# Patient Record
Sex: FEMALE | Race: WHITE | NOT HISPANIC OR LATINO | Employment: OTHER | URBAN - METROPOLITAN AREA
[De-identification: names, ages, dates, MRNs, and addresses within clinical notes are randomized per-mention and may not be internally consistent; named-entity substitution may affect disease eponyms.]

---

## 2017-04-16 ENCOUNTER — TELEPHONE (OUTPATIENT)
Dept: NURSING | Facility: CLINIC | Age: 37
End: 2017-04-16

## 2017-04-16 NOTE — TELEPHONE ENCOUNTER
"Call Type: Triage Call    Presenting Problem: Patient is having a \"manic episode,\" and can't  sleeep. Patient stopped taking zoloft per Deon's nurse line.  Triaged for bipolar disorder, Diagnosed/Disposition to see ED  immediately.  Triage Note:  Guideline Title: Bipolar Disorder, Diagnosed  Recommended Disposition: See ED Immediately  Original Inclination: Wanted to speak with a nurse  Override Disposition:  Intended Action: Go to Hospital / ED  Physician Contacted: No  History of recurrent manic episodes AND having similar symptoms now ?  YES  Incoherent speech ? NO  Alcohol abuse - known or suspected ? NO  Currently engaging in violent behavior ? NO  Currently threatening violence ? NO  Substance abuse known or suspected ? NO  Depression known or suspected ? NO  Hearing voices that no one else hears or seeing things that no one else sees ? NO  Any suicidal or homicidal threat(s) with defined plan ? NO  Any suicidal or homicidal threat AND hallucinations, delirium, delusions or  paranoia ? NO  New or worsening confusion, disorientation, or agitation ? NO  Any homicidal/destructive ideation, any suicidal ideation, any history of suicide  attempts, and/or any history of self destructive behavior ? NO  Any suicidal or homicidal attempt(s) or gesture(s) in progress. ? NO  Irritable/agitated AND recently used alcohol or illegal drugs (PCP, cocaine,  methamphetamine) or overdose of prescription medication ? NO  Physician Instructions:  Care Advice: Another adult should drive.  Remove dangerous objects from patient's access.  Avoid confronting or agitating patient.  Minimize stimulation in environment.  Call  if behavior becomes actively suicidal, self-destructive, or  violent.  Protect the patient from harm or injury, if possible, while not putting  others or yourself at risk.  Should not be alone, arrange for support (family member, friend, etc.).  IMMEDIATE ACTION  Try to discourage or prevent reckless " behavior (keep car keys from patient)  without worsening threatening behavior.  Write down provider's name. List or place the following in a bag for  transport with the patient: current prescription and/or nonprescription  medications  alternative treatments, therapies and medications  and street drugs.  Avoid or limit use of caffeine, alcohol and non-prescription drugs.

## 2018-10-05 ENCOUNTER — OFFICE VISIT (OUTPATIENT)
Dept: URGENT CARE | Facility: URGENT CARE | Age: 38
End: 2018-10-05
Payer: COMMERCIAL

## 2018-10-05 ENCOUNTER — RADIANT APPOINTMENT (OUTPATIENT)
Dept: GENERAL RADIOLOGY | Facility: CLINIC | Age: 38
End: 2018-10-05
Attending: PHYSICIAN ASSISTANT
Payer: COMMERCIAL

## 2018-10-05 VITALS
HEART RATE: 101 BPM | TEMPERATURE: 98.6 F | DIASTOLIC BLOOD PRESSURE: 74 MMHG | WEIGHT: 200 LBS | SYSTOLIC BLOOD PRESSURE: 112 MMHG | OXYGEN SATURATION: 98 %

## 2018-10-05 DIAGNOSIS — R05.9 COUGH: ICD-10-CM

## 2018-10-05 DIAGNOSIS — R53.83 FATIGUE, UNSPECIFIED TYPE: ICD-10-CM

## 2018-10-05 DIAGNOSIS — R10.84 ABDOMINAL PAIN, GENERALIZED: ICD-10-CM

## 2018-10-05 DIAGNOSIS — R05.9 COUGH: Primary | ICD-10-CM

## 2018-10-05 LAB
ALBUMIN SERPL-MCNC: 3.9 G/DL (ref 3.4–5)
ALP SERPL-CCNC: 85 U/L (ref 40–150)
ALT SERPL W P-5'-P-CCNC: 42 U/L (ref 0–50)
ANION GAP SERPL CALCULATED.3IONS-SCNC: 6 MMOL/L (ref 3–14)
AST SERPL W P-5'-P-CCNC: 37 U/L (ref 0–45)
BASOPHILS # BLD AUTO: 0.1 10E9/L (ref 0–0.2)
BASOPHILS NFR BLD AUTO: 0.5 %
BILIRUB SERPL-MCNC: 0.6 MG/DL (ref 0.2–1.3)
BUN SERPL-MCNC: 9 MG/DL (ref 7–30)
CALCIUM SERPL-MCNC: 9.7 MG/DL (ref 8.5–10.1)
CHLORIDE SERPL-SCNC: 106 MMOL/L (ref 94–109)
CO2 SERPL-SCNC: 27 MMOL/L (ref 20–32)
CREAT SERPL-MCNC: 0.8 MG/DL (ref 0.52–1.04)
DIFFERENTIAL METHOD BLD: ABNORMAL
EOSINOPHIL # BLD AUTO: 0.6 10E9/L (ref 0–0.7)
EOSINOPHIL NFR BLD AUTO: 4.3 %
ERYTHROCYTE [DISTWIDTH] IN BLOOD BY AUTOMATED COUNT: 13.4 % (ref 10–15)
GFR SERPL CREATININE-BSD FRML MDRD: 80 ML/MIN/1.7M2
GLUCOSE SERPL-MCNC: 153 MG/DL (ref 70–99)
HCT VFR BLD AUTO: 45.8 % (ref 35–47)
HGB BLD-MCNC: 14.7 G/DL (ref 11.7–15.7)
LYMPHOCYTES # BLD AUTO: 2.9 10E9/L (ref 0.8–5.3)
LYMPHOCYTES NFR BLD AUTO: 22.7 %
MCH RBC QN AUTO: 28.5 PG (ref 26.5–33)
MCHC RBC AUTO-ENTMCNC: 32.1 G/DL (ref 31.5–36.5)
MCV RBC AUTO: 89 FL (ref 78–100)
MONOCYTES # BLD AUTO: 0.7 10E9/L (ref 0–1.3)
MONOCYTES NFR BLD AUTO: 5.3 %
NEUTROPHILS # BLD AUTO: 8.7 10E9/L (ref 1.6–8.3)
NEUTROPHILS NFR BLD AUTO: 67.2 %
PLATELET # BLD AUTO: 429 10E9/L (ref 150–450)
POTASSIUM SERPL-SCNC: 4 MMOL/L (ref 3.4–5.3)
PROT SERPL-MCNC: 7.7 G/DL (ref 6.8–8.8)
RBC # BLD AUTO: 5.16 10E12/L (ref 3.8–5.2)
SODIUM SERPL-SCNC: 139 MMOL/L (ref 133–144)
WBC # BLD AUTO: 13 10E9/L (ref 4–11)

## 2018-10-05 PROCEDURE — 84443 ASSAY THYROID STIM HORMONE: CPT | Performed by: PHYSICIAN ASSISTANT

## 2018-10-05 PROCEDURE — 36415 COLL VENOUS BLD VENIPUNCTURE: CPT | Performed by: PHYSICIAN ASSISTANT

## 2018-10-05 PROCEDURE — 85025 COMPLETE CBC W/AUTO DIFF WBC: CPT | Performed by: PHYSICIAN ASSISTANT

## 2018-10-05 PROCEDURE — 80053 COMPREHEN METABOLIC PANEL: CPT | Performed by: PHYSICIAN ASSISTANT

## 2018-10-05 PROCEDURE — 71046 X-RAY EXAM CHEST 2 VIEWS: CPT

## 2018-10-05 PROCEDURE — 99203 OFFICE O/P NEW LOW 30 MIN: CPT | Performed by: PHYSICIAN ASSISTANT

## 2018-10-05 RX ORDER — LITHIUM CARBONATE 300 MG/1
1200 TABLET, FILM COATED, EXTENDED RELEASE ORAL
COMMUNITY
End: 2019-04-17

## 2018-10-05 RX ORDER — SERTRALINE HYDROCHLORIDE 100 MG/1
100 TABLET, FILM COATED ORAL
COMMUNITY
Start: 2017-03-26 | End: 2019-04-17

## 2018-10-05 RX ORDER — AZITHROMYCIN 250 MG/1
TABLET, FILM COATED ORAL
Qty: 6 TABLET | Refills: 0 | Status: SHIPPED | OUTPATIENT
Start: 2018-10-05 | End: 2019-04-05

## 2018-10-05 NOTE — MR AVS SNAPSHOT
"              After Visit Summary   10/5/2018    Katrin KEITH    MRN: [id number]           Patient Information     Date Of Birth          1980        Visit Information        Provider Department      10/5/2018 9:30 AM Gudelia Spencer PA-C [facility]        Today's Diagnoses     Cough    -  1    Fatigue, unspecified type        Abdominal pain, generalized           Follow-ups after your visit        Who to contact     If you have questions or need follow up information about today's clinic visit or your schedule please contact [facility] directly at [phone].  Normal or non-critical lab and imaging results will be communicated to you by [employer], letter or phone within 4 business days after the clinic has received the results. If you do not hear from us within 7 days, please contact the clinic through [employer] or phone. If you have a critical or abnormal lab result, we will notify you by phone as soon as possible.  Submit refill requests through [employer] or call your pharmacy and they will forward the refill request to us. Please allow 3 business days for your refill to be completed.          Additional Information About Your Visit        MyChart Information     [employer] lets you send messages to your doctor, view your test results, renew your prescriptions, schedule appointments and more. To sign up, go to [email or web address] . Click on \"Log in\" on the left side of the screen, which will take you to the Welcome page. Then click on \"Sign up Now\" on the right side of the page.     You will be asked to enter the access code listed below, as well as some personal information. Please follow the directions to create your username and password.     Your access code is: [email or web address]  Expires: 2019 12:43 PM     Your access code will  in 90 days. If you need help or a new code, please call your [facility] clinic or [phone].        Care EveryWhere ID     This is " your Care EveryWhere ID. This could be used by other organizations to access your [facility] medical records  [id number]        Your Vitals Were     Pulse Temperature Pulse Oximetry             101 98.6  F (37  C) (Tympanic) 98%          Blood Pressure from Last 3 Encounters:   10/05/18 112/74   09/30/05 110/66    Weight from Last 3 Encounters:   10/05/18 200 lb (90.7 kg)   09/30/05 159 lb 12.8 oz (72.5 kg)              We Performed the Following     CBC with platelets and differential     Comprehensive metabolic panel (BMP + Alb, Alk Phos, ALT, AST, Total. Bili, TP)     TSH with free T4 reflex          Today's Medication Changes          These changes are accurate as of 10/5/18 11:59 PM.  If you have any questions, ask your nurse or doctor.               Start taking these medicines.        Dose/Directions    azithromycin 250 MG tablet   Commonly known as:  ZITHROMAX   Used for:  Cough   Started by:  Gudelia Spencer PA-C        Two tablets first day, then one tablet daily for four days.   Quantity:  6 tablet   Refills:  0            Where to get your medicines      These medications were sent to [id number] - [place], [place] - [address] [facility]  [address] [facility] [address]     Phone:  [phone]     azithromycin 250 MG tablet                Primary Care Provider Fax #    Physician No Ref-Primary [phone]       No address on file        Equal Access to Services     ELOINA ARNETT AH: Jose onofre Soyoana, waaxda luqadaha, qaybta kaalmada eladio, andres srivastava. So [facility] [phone].    ATENCIÓN: Si habla español, tiene a vides disposición servicios gratuitos de asistencia lingüística. Llame al [phone].    We comply with applicable federal civil rights laws and [place] laws. We do not discriminate on the basis of race, color, national origin, age, disability, sex, sexual orientation, or gender identity.            Thank you!      Thank you for choosing [facility]  for your care. Our goal is always to provide you with excellent care. Hearing back from our patients is one way we can continue to improve our services. Please take a few minutes to complete the written survey that you may receive in the mail after your visit with us. Thank you!             Your Updated Medication List - Protect others around you: Learn how to safely use, store and throw away your medicines at www.disposemymeds.org.          This list is accurate as of 10/5/18 11:59 PM.  Always use your most recent med list.                   Brand Name Dispense Instructions for use Diagnosis    azithromycin 250 MG tablet    ZITHROMAX    6 tablet    Two tablets first day, then one tablet daily for four days.    Cough       LAMICTAL PO      Take 300 mg by mouth        lithium 300 MG CR tablet    ESKALITH/LITHOBID     Take 1,200 mg by mouth        sertraline 100 MG tablet    ZOLOFT     Take 100 mg by mouth

## 2018-10-05 NOTE — PROGRESS NOTES
SUBJECTIVE:   Katrin KEITH is a 38 year old female presenting with a chief complaint of cough, cold sx, stomach crams.  Did have a few episodes of coughing blood but looked dark brown and no bright red.  Denies SOB or chest pain.  No UTI or vaginal sx.  No fevers.  Still able to eat and drink well.  No travel . No weight loss, bruising.    Onset of symptoms was 1 week(s) ago.  Course of illness is same.    Severity moderate  Current and Associated symptoms: lucas  Treatment measures tried include Tylenol/Ibuprofen, Fluids and Rest.  Predisposing factors include none.    Past Medical History:   Diagnosis Date     Allergic rhinitis, cause unspecified      Current Outpatient Prescriptions   Medication Sig Dispense Refill     azithromycin (ZITHROMAX) 250 MG tablet Two tablets first day, then one tablet daily for four days. 6 tablet 0     LamoTRIgine (LAMICTAL PO) Take 300 mg by mouth       lithium (ESKALITH/LITHOBID) 300 MG CR tablet Take 1,200 mg by mouth       sertraline (ZOLOFT) 100 MG tablet Take 100 mg by mouth       Social History   Substance Use Topics     Smoking status: Never Smoker     Smokeless tobacco: Not on file     Alcohol use Yes      Comment: 2-3 drinks per week       ROS:  Review of systems negative except as stated above.    OBJECTIVE:  /74 (BP Location: Right arm, Patient Position: Chair, Cuff Size: Adult Large)  Pulse 101  Temp 98.6  F (37  C) (Tympanic)  Wt 200 lb (90.7 kg)  SpO2 98%  GENERAL APPEARANCE: healthy, alert and no distress  EYES: EOMI,  PERRL, conjunctiva clear  HENT: ear canals and TM's normal.  Nose and mouth without ulcers, erythema or lesions  NECK: supple, nontender, no lymphadenopathy  RESP: lungs clear to auscultation - no rales, rhonchi or wheezes  CV: regular rates and rhythm, normal S1 S2, no murmur noted  ABDOMEN: obese, soft, normal bowel sounds, no significant tenderness.  No guarding or rebound tenderness.    NEURO: Normal strength and tone, sensory exam  grossly normal,  normal speech and mentation  SKIN: no suspicious lesions or rashes    Results for orders placed or performed in visit on 10/05/18   CBC with platelets and differential   Result Value Ref Range    WBC 13.0 (H) 4.0 - 11.0 10e9/L    RBC Count 5.16 3.8 - 5.2 10e12/L    Hemoglobin 14.7 11.7 - 15.7 g/dL    Hematocrit 45.8 35.0 - 47.0 %    MCV 89 78 - 100 fl    MCH 28.5 26.5 - 33.0 pg    MCHC 32.1 31.5 - 36.5 g/dL    RDW 13.4 10.0 - 15.0 %    Platelet Count 429 150 - 450 10e9/L    % Neutrophils 67.2 %    % Lymphocytes 22.7 %    % Monocytes 5.3 %    % Eosinophils 4.3 %    % Basophils 0.5 %    Absolute Neutrophil 8.7 (H) 1.6 - 8.3 10e9/L    Absolute Lymphocytes 2.9 0.8 - 5.3 10e9/L    Absolute Monocytes 0.7 0.0 - 1.3 10e9/L    Absolute Eosinophils 0.6 0.0 - 0.7 10e9/L    Absolute Basophils 0.1 0.0 - 0.2 10e9/L    Diff Method Automated Method    TSH with free T4 reflex   Result Value Ref Range    TSH 2.08 0.40 - 4.00 mU/L   Comprehensive metabolic panel (BMP + Alb, Alk Phos, ALT, AST, Total. Bili, TP)   Result Value Ref Range    Sodium 139 133 - 144 mmol/L    Potassium 4.0 3.4 - 5.3 mmol/L    Chloride 106 94 - 109 mmol/L    Carbon Dioxide 27 20 - 32 mmol/L    Anion Gap 6 3 - 14 mmol/L    Glucose 153 (H) 70 - 99 mg/dL    Urea Nitrogen 9 7 - 30 mg/dL    Creatinine 0.80 0.52 - 1.04 mg/dL    GFR Estimate 80 >60 mL/min/1.7m2    GFR Estimate If Black >90 >60 mL/min/1.7m2    Calcium 9.7 8.5 - 10.1 mg/dL    Bilirubin Total 0.6 0.2 - 1.3 mg/dL    Albumin 3.9 3.4 - 5.0 g/dL    Protein Total 7.7 6.8 - 8.8 g/dL    Alkaline Phosphatase 85 40 - 150 U/L    ALT 42 0 - 50 U/L    AST 37 0 - 45 U/L     Chest x-ray - Heart size is normal. No pleural effusion, pneumothorax,  or abnormal area of consolidation. Nodule is noted in the  inferolateral left lung, measuring up to 1.3 cm. This may be calcified  and therefore indicative of a granuloma. However, comparison to  previous radiographs. If none, followup chest x-ray in 3-6  months.  Additional dense nodule overlies the right hemithorax on PA  radiograph, likely a 5 mm granuloma.    assessment/plan:  (R05) Cough  (primary encounter diagnosis)  Comment:   Plan: XR Chest 2 Views, CBC with platelets and         differential, azithromycin (ZITHROMAX) 250 MG         tablet          X-ray as above and attempted to call with radiology report. Case discussed with Dr Phoenix due to sx and elevated slightly WBC and agrees with above plan.  OTC med for sx relief and to Follow-up with PCP as needed if sx worsen or new sx develop    (R53.83) Fatigue, unspecified type  Comment:   Plan: TSH with free T4 reflex         Follow-up with PCP     (R10.84) Abdominal pain, generalized  Comment:   Plan: Comprehensive metabolic panel (BMP + Alb, Alk         Phos, ALT, AST, Total. Bili, TP)         Declines abdominal x-ray.  If worsens to the ER for some other imagining.  Red flag signs discussed and to Follow-up with PCP as needed. .

## 2018-10-06 LAB — TSH SERPL DL<=0.005 MIU/L-ACNC: 2.08 MU/L (ref 0.4–4)

## 2018-10-08 ENCOUNTER — TELEPHONE (OUTPATIENT)
Dept: URGENT CARE | Facility: URGENT CARE | Age: 38
End: 2018-10-08

## 2018-10-09 NOTE — TELEPHONE ENCOUNTER
Attempted to contact patient regarding chest x-ray.  No answer.  Please try to call and advise that Follow-up in 3-6 months for repeat x-ray per radiology recommendation... See report     Zoraida Spencer

## 2018-10-10 NOTE — TELEPHONE ENCOUNTER
Called patient, left message to return phone call.     Please advise Radiology report and Gudelia Spencer message when returning phone call.     Jd Weiss, Medical Assistant

## 2019-02-26 DIAGNOSIS — F31.81 BIPOLAR II DISORDER (H): Primary | ICD-10-CM

## 2019-02-26 DIAGNOSIS — Z79.899 ENCOUNTER FOR LONG-TERM (CURRENT) USE OF HIGH-RISK MEDICATION: ICD-10-CM

## 2019-02-26 LAB — LITHIUM SERPL-SCNC: 0.49 MMOL/L (ref 0.6–1.2)

## 2019-02-26 PROCEDURE — 36415 COLL VENOUS BLD VENIPUNCTURE: CPT | Performed by: CLINICAL NURSE SPECIALIST

## 2019-02-26 PROCEDURE — 80178 ASSAY OF LITHIUM: CPT | Performed by: CLINICAL NURSE SPECIALIST

## 2019-02-26 PROCEDURE — 82565 ASSAY OF CREATININE: CPT | Performed by: CLINICAL NURSE SPECIALIST

## 2019-02-26 PROCEDURE — 84443 ASSAY THYROID STIM HORMONE: CPT | Performed by: CLINICAL NURSE SPECIALIST

## 2019-02-26 PROCEDURE — 84520 ASSAY OF UREA NITROGEN: CPT | Performed by: CLINICAL NURSE SPECIALIST

## 2019-02-27 LAB
BUN SERPL-MCNC: 11 MG/DL (ref 7–30)
CREAT SERPL-MCNC: 0.75 MG/DL (ref 0.52–1.04)
GFR SERPL CREATININE-BSD FRML MDRD: >90 ML/MIN/{1.73_M2}
TSH SERPL DL<=0.005 MIU/L-ACNC: 2.36 MU/L (ref 0.4–4)

## 2019-03-05 ENCOUNTER — NURSE TRIAGE (OUTPATIENT)
Dept: NURSING | Facility: CLINIC | Age: 39
End: 2019-03-05

## 2019-04-04 NOTE — PROGRESS NOTES
"   SUBJECTIVE:   CC: Katrin Logan is an 39 year old woman who presents for preventive health visit.     HPI  {Add if <65 person on Medicare  - Required Questions (Optional):142298}  {Outside tests to abstract? :275719}    {additional problems to add (Optional):930325}    Today's PHQ-2 Score: No flowsheet data found.    Abuse: Current or Past(Physical, Sexual or Emotional)- {YES/NO/NA:075313}  Do you feel safe in your environment? {YES/NO/NA:034977}    Social History     Tobacco Use     Smoking status: Never Smoker   Substance Use Topics     Alcohol use: Yes     Comment: 2-3 drinks per week     No flowsheet data found.{add AUDIT responses (Optional) (A score of 7 for adult men is an indication of hazardous drinking; a score of 8 or more is an indication of an alcohol use disorder.  A score of 7 or more for adult women is an indication of hazardous drinking or an alchohol use disorder):323296}    Reviewed orders with patient.  Reviewed health maintenance and updated orders accordingly - {Yes/No:147523::\"Yes\"}  {Chronicprobdata (Optional):483350}    {Mammo Decision Support (Optional):070757}    Pertinent mammograms are reviewed under the imaging tab.  History of abnormal Pap smear: {PAP HX:833868}  PAP / HPV 9/30/2005   PAP NIL     Reviewed and updated as needed this visit by clinical staff         Reviewed and updated as needed this visit by Provider        {HISTORY OPTIONS (Optional):213521}    Review of Systems  {FEMALE ROS (Optional):342634}     OBJECTIVE:   There were no vitals taken for this visit.  Physical Exam  {Exam Choices (Optional):904947}    {Diagnostic Test Results (Optional):795945::\"Diagnostic Test Results:\",\"none \"}    ASSESSMENT/PLAN:   {Diag Picklist:405969}    COUNSELING:  {FEMALE COUNSELING MESSAGES:780086::\"Reviewed preventive health counseling, as reflected in patient instructions\"}    BP Readings from Last 1 Encounters:   10/05/18 112/74     Estimated body mass index is 23.77 kg/m  as " "calculated from the following:    Height as of 9/30/05: 1.746 m (5' 8.75\").    Weight as of 9/30/05: 72.5 kg (159 lb 12.8 oz).    {BP Counseling- Complete if BP >= 120/80  (Optional):814096}  {Weight Management Plan (ACO) Complete if BMI is abnormal-  Ages 18-64  BMI >24.9.  Age 65+ with BMI <23 or >30 (Optional):740354}     reports that  has never smoked. She does not have any smokeless tobacco history on file.  {Tobacco Cessation -- Complete if patient is a smoker (Optional):755369}    Counseling Resources:  ATP IV Guidelines  Pooled Cohorts Equation Calculator  Breast Cancer Risk Calculator  FRAX Risk Assessment  ICSI Preventive Guidelines  Dietary Guidelines for Americans, 2010  USDA's MyPlate  ASA Prophylaxis  Lung CA Screening    Evelyn Dyson MD  [facility]  "

## 2019-04-04 NOTE — PATIENT INSTRUCTIONS
[facility] and send me the doses of medications. Let me know if the appointment is further out.     Need to see psychiatrist or NP psychiatrist to refill medications.     [facility] [facility] in [place]: they typically require you see a therapist there   [phone] [address] [place] [address]    We have [facility] but in [place]     Last lithium level was 0.49. Low end or normal is 0.6.

## 2019-04-05 ENCOUNTER — OFFICE VISIT (OUTPATIENT)
Dept: PEDIATRICS | Facility: CLINIC | Age: 39
End: 2019-04-05
Payer: COMMERCIAL

## 2019-04-05 VITALS
HEART RATE: 91 BPM | TEMPERATURE: 99.9 F | DIASTOLIC BLOOD PRESSURE: 80 MMHG | SYSTOLIC BLOOD PRESSURE: 128 MMHG | BODY MASS INDEX: 28.88 KG/M2 | HEIGHT: 69 IN | WEIGHT: 195 LBS | OXYGEN SATURATION: 97 %

## 2019-04-05 DIAGNOSIS — F31.81 BIPOLAR 2 DISORDER (H): Primary | ICD-10-CM

## 2019-04-05 PROCEDURE — 99213 OFFICE O/P EST LOW 20 MIN: CPT | Performed by: INTERNAL MEDICINE

## 2019-04-05 ASSESSMENT — MIFFLIN-ST. JEOR: SCORE: 1618.89

## 2019-04-05 NOTE — PROGRESS NOTES
SUBJECTIVE:   Katrin Logan is a 39 year old female who presents to clinic today for the following health issues:      Pt is here to establish care is looking for a doctor closer to home. Notes she is bipolar, anxiety and depression along with panic attacks. She has issues with panic attacks as well. She also had been seeing psych NP at [facility]. Has been in emergency department for anxiety/depression in the past. No more suicidal threats but has in the past. Is on meds for about 2 years and is now stable reports. She would like to consolidate her care here.         Problem list and histories reviewed & adjusted, as indicated.  Additional history: as documented    Patient Active Problem List   Diagnosis     Allergic rhinitis     Bipolar 2 disorder (H)     Past Surgical History:   Procedure Laterality Date     NO HISTORY OF SURGERY         Social History     Tobacco Use     Smoking status: Never Smoker     Smokeless tobacco: Never Used   Substance Use Topics     Alcohol use: Yes     Comment: 2-3 drinks per week     Family History   Problem Relation Age of Onset     Gynecology Mother         hysterectomy     Lipids Father      Heart Disease Maternal Grandfather         pacemaker     Heart Disease Paternal Grandmother         CHF     Diabetes Paternal Grandfather      Heart Disease Paternal Grandfather         MI     Thyroid Disease Sister      Depression Sister         anxiety also dx         Current Outpatient Medications   Medication Sig Dispense Refill     LamoTRIgine (LAMICTAL PO) Take 300 mg by mouth       lithium (ESKALITH/LITHOBID) 300 MG CR tablet Take 1,200 mg by mouth       sertraline (ZOLOFT) 100 MG tablet Take 100 mg by mouth       Allergies   Allergen Reactions     Morphine Itching     BP Readings from Last 3 Encounters:   04/05/19 128/80   10/05/18 112/74   09/30/05 110/66    Wt Readings from Last 3 Encounters:   04/05/19 88.5 kg (195 lb)   10/05/18 90.7 kg (200 lb)   09/30/05 72.5 kg (159 lb 12.8  "oz)                  Labs reviewed in EPIC    Reviewed and updated as needed this visit by clinical staff  Tobacco  Allergies  Meds  Soc Hx      Reviewed and updated as needed this visit by Provider         ROS:  Constitutional, HEENT, cardiovascular, pulmonary, GI, , musculoskeletal, neuro, skin, endocrine and psych systems are negative, except as otherwise noted.    This document serves as a record of the services and decisions personally performed and made by Evelyn Dyson MD. It was created on her behalf by Leatha Briggs, a trained medical scribe. The creation of this document is based the provider's statements to the medical scribe.    Leatha Briggs April 5, 2019 10:06 AM  OBJECTIVE:     /80 (BP Location: Right arm, Patient Position: Sitting, Cuff Size: Adult Regular)   Pulse 91   Temp 99.9  F (37.7  C) (Oral)   Ht 1.753 m (5' 9\")   Wt 88.5 kg (195 lb)   SpO2 97%   BMI 28.80 kg/m    Body mass index is 28.8 kg/m .  GENERAL: healthy, alert and no distress  RESP: lungs clear to auscultation - no rales, rhonchi or wheezes  CV: regular rate and rhythm, normal S1 S2, no S3 or S4, no murmur, click or rub, no peripheral edema   PSYCH: mentation appears normal, affect normal/bright    Diagnostic Test Results:  No results found for this or any previous visit (from the past 24 hour(s)).    ASSESSMENT/PLAN:   (F31.81) Bipolar 2 disorder (H)  (primary encounter diagnosis)  --  Discussed with patient I am not comfortable prescribing long term psych medications for bipolar   -- also discussed that our MTM does not have that experience as well   -- stressed to patient she needs to see psych NP or psychiatrist for medications   -- recommended [facility] but would need to see therapist; fv psych but is in [place] where current CNS is   -- will give enough refill until she is able to get back into psych NP   -- would recommended she continues with psych NP   -- patient to mychart with medication dose "       Follow up for physical or as needed.     The information in this document, created by the medical scribe for me, accurately reflects the services I personally performed and the decisions made by me. I have reviewed and approved this document for accuracy prior to leaving the patient care area.  Evelyn Dyson MD  [facility]

## 2019-04-08 ENCOUNTER — OFFICE VISIT (OUTPATIENT)
Dept: ENDOCRINOLOGY | Facility: CLINIC | Age: 39
End: 2019-04-08
Payer: COMMERCIAL

## 2019-04-08 VITALS
OXYGEN SATURATION: 98 % | SYSTOLIC BLOOD PRESSURE: 118 MMHG | HEART RATE: 82 BPM | DIASTOLIC BLOOD PRESSURE: 74 MMHG | BODY MASS INDEX: 28.88 KG/M2 | WEIGHT: 195 LBS | HEIGHT: 69 IN | RESPIRATION RATE: 16 BRPM | TEMPERATURE: 99.2 F

## 2019-04-08 DIAGNOSIS — N94.3 PREMENSTRUAL SYMPTOM: ICD-10-CM

## 2019-04-08 LAB
ESTRADIOL SERPL-MCNC: 84 PG/ML
FSH SERPL-ACNC: 4.7 IU/L
LH SERPL-ACNC: 6.5 IU/L

## 2019-04-08 PROCEDURE — 99203 OFFICE O/P NEW LOW 30 MIN: CPT | Performed by: INTERNAL MEDICINE

## 2019-04-08 PROCEDURE — 82670 ASSAY OF TOTAL ESTRADIOL: CPT | Performed by: INTERNAL MEDICINE

## 2019-04-08 PROCEDURE — 83002 ASSAY OF GONADOTROPIN (LH): CPT | Performed by: INTERNAL MEDICINE

## 2019-04-08 PROCEDURE — 36415 COLL VENOUS BLD VENIPUNCTURE: CPT | Performed by: INTERNAL MEDICINE

## 2019-04-08 PROCEDURE — 83001 ASSAY OF GONADOTROPIN (FSH): CPT | Performed by: INTERNAL MEDICINE

## 2019-04-08 ASSESSMENT — MIFFLIN-ST. JEOR: SCORE: 1618.89

## 2019-04-08 NOTE — LETTER
[facility]  [address], [address]  [place] [address]  [phone]        April 9, 2019    Katrin Logan  [address] [id number]            Dear Ms. Katrin Logan:    Labs/ Imaging studies done with endocrinology showed all labs are in acceptable range.     Follow up as discussed in last clinic visit.     Please call endocrinology clinic (nurse line: [phone]) if questions.     Sincerely,        Dr. Dorie Bosch/live    Results for orders placed or performed in visit on 04/08/19   Follicle stimulating hormone   Result Value Ref Range    FSH 4.7 IU/L   Lutropin   Result Value Ref Range    Lutropin 6.5 IU/L   Estradiol   Result Value Ref Range    Estradiol 84 pg/mL

## 2019-04-08 NOTE — LETTER
"    2019         RE: Katrin Logan  [address]  [place] [address]        Dear Colleague,    [facility] you [facility]. Please see a copy of my visit note below.    Name: Katrin Logan  Self referral for 'hormonal checkup'  Chief Complaint   Patient presents with     Referral     nasea reflux fatigue       HPI:  Katrin Logan is a 39 year old female who presents for the evaluation of \"hormonal issues'  Past medical history of allergic rhinitis, bipolar disorder on lithium.  Today she reports that she gets symptoms like more acid reflux, nausea, sometimes vomiting during ovulation and it last for a few days after periods.  She is wondering if there is medication for this and wants to get all hormonal workup done.    During ovulation feels like she has more GERD s/s. Takes OTC antacid which helps.  Sometimes feels nausea.  Other s/s include sleeplessness and anxiety  Sometimes HA  Sometimes extreme fatigue.  Periods are regular.  Had been on OC pills in past- that made her feels emotionally distant and does not want to be on it.  Has 1 kids- no complication during pregnancy. Is 9 years old. It was a .  No problems with fertility in the past  H/o miscarriage- 2 miscarriage. , 2016   Weight has been stable.  Wt Readings from Last 2 Encounters:   19 88.5 kg (195 lb)   19 88.5 kg (195 lb)     She denies chest pain, shortness of breath, palpitations, constipation, diarrhea, dysphagia, joint pains.  PMH/PSH:  Past Medical History:   Diagnosis Date     Allergic rhinitis, cause unspecified      Past Surgical History:   Procedure Laterality Date     NO HISTORY OF SURGERY       Family Hx:  Family History   Problem Relation Age of Onset     Gynecology Mother         hysterectomy     Lipids Father      Heart Disease Maternal Grandfather         pacemaker     Heart Disease Paternal Grandmother         CHF     Diabetes Paternal Grandfather  "     Heart Disease Paternal Grandfather         MI     Thyroid Disease Sister      Depression Sister         anxiety also dx            Social Hx:  Social History     Socioeconomic History     Marital status:      Spouse name: Not on file     Number of children: Not on file     Years of education: Not on file     Highest education level: Not on file   Occupational History     Not on file   Social Needs     Financial resource strain: Not on file     Food insecurity:     Worry: Not on file     Inability: Not on file     Transportation needs:     Medical: Not on file     Non-medical: Not on file   Tobacco Use     Smoking status: Never Smoker     Smokeless tobacco: Never Used   Substance and Sexual Activity     Alcohol use: Yes     Comment: 2-3 drinks per week     Drug use: No     Sexual activity: Never   Lifestyle     Physical activity:     Days per week: Not on file     Minutes per session: Not on file     Stress: Not on file   Relationships     Social connections:     Talks on phone: Not on file     Gets together: Not on file     Attends Congregational service: Not on file     Active member of club or organization: Not on file     Attends meetings of clubs or organizations: Not on file     Relationship status: Not on file     Intimate partner violence:     Fear of current or ex partner: Not on file     Emotionally abused: Not on file     Physically abused: Not on file     Forced sexual activity: Not on file   Other Topics Concern     Not on file   Social History Narrative    Caffeine intake/servings daily - 2    Calcium intake/servings daily - 2    Exercise 3 times weekly - describe running or walking-yoga    Sunscreen used - Yes    Seatbelts used - Yes    Guns stored in the home - No    Self Breast Exam - No    Pap test up to date -  Yes    Eye exam up to date -  Yes    Dental exam up to date -  No    DEXA scan up to date -  Not Applicable    Flex Sig/Colonoscopy up to date -  Not Applicable    Mammography up to  "date -  Not Applicable    Immunizations reviewed and up to date - No    Abuse: Current or Past (Physical, Sexual or Emotional) - No    Do you feel safe in your environment - Yes    Do you cope well with stress - Yes    Do you suffer from insomnia - No    Last updated by: Abi Christian  9/30/2005          MEDICATIONS:  has a current medication list which includes the following prescription(s): lamotrigine, lithium er, and sertraline.    ROS     ROS: 10 point ROS neg other than the symptoms noted above in the HPI.    Physical Exam   VS: /74   Pulse 82   Temp 99.2  F (37.3  C) (Tympanic)   Resp 16   Ht 1.753 m (5' 9\")   Wt 88.5 kg (195 lb)   SpO2 98%   BMI 28.80 kg/m     GENERAL: AXOX3, NAD, well dressed, answering questions appropriately, appears stated age.  HEENT: No exopthalmous, no proptosis, EOMI, no lig lag, no retraction  NECK: Thyroid normal in size, non tender, no nodules were palpated.  CV: RRR  LUNGS: CTAB  ABDOMEN: +BS  NEUROLOGY: CN grossly intact, no tremors  PSYCH: normal affect and mood    LABS:  Last Basic Metabolic Panel:  Lab Results   Component Value Date     10/05/2018      Lab Results   Component Value Date    POTASSIUM 4.0 10/05/2018     Lab Results   Component Value Date    CHLORIDE 106 10/05/2018     Lab Results   Component Value Date    MIKE 9.7 10/05/2018     Lab Results   Component Value Date    CO2 27 10/05/2018     Lab Results   Component Value Date    BUN 11 02/26/2019     Lab Results   Component Value Date    CR 0.75 02/26/2019     Lab Results   Component Value Date     10/05/2018       TSH   Date Value Ref Range Status   02/26/2019 2.36 0.40 - 4.00 mU/L Final   ]      All pertinent notes, labs, and images personally reviewed by me.     A/P  Ms.Alison Logan is a 39 year old here for the evaluation of:  Premenstrual symptoms:  Discussed diagnosis, pathophysiology, management and treatment options of condition with pt.  Plan to get labs as noted below  I discussed " starting oral contraceptive pills but she is not interested in that  I discussed that it is mostly symptomatic management for premenstrual symptoms  Discussed serotonin specific reuptake inhibitor but given h/o bipolar disorder it needs to be primarily managed by her psychiatrist.  Discussed wt loss and meditation.  Further workup based on labs.  Plan: Follicle stimulating hormone, Lutropin,         Estradiol        More than 50% of face to face time spent with Ms. Logan on counseling / coordinating her care.    All questions were answered.  The patient indicates understanding of the above issues and agrees with the plan set forth.       Follow-up:  Based on labs.    Dorie Bosch MD  Endocrinology   [facility]    CC: No Ref-Primary, Physician     Disclaimer: This note consists of symbols derived from keyboarding, dictation and/or voice recognition software. As a result, there may be errors in the script that have gone undetected. Please consider this when interpreting information found in this chart.    Addendum to above note and clinic visit:    Labs reviewed.    See result note/telephone encounter.            Again, thank you for allowing me to participate in the care of your patient.        Sincerely,        Dorie Bosch MD

## 2019-04-08 NOTE — PROGRESS NOTES
"Name: Katrin Logan  Self referral for 'hormonal checkup'  Chief Complaint   Patient presents with     Referral     nasea reflux fatigue       HPI:  Katrin Logan is a 39 year old female who presents for the evaluation of \"hormonal issues'  Past medical history of allergic rhinitis, bipolar disorder on lithium.  Today she reports that she gets symptoms like more acid reflux, nausea, sometimes vomiting during ovulation and it last for a few days after periods.  She is wondering if there is medication for this and wants to get all hormonal workup done.    During ovulation feels like she has more GERD s/s. Takes OTC antacid which helps.  Sometimes feels nausea.  Other s/s include sleeplessness and anxiety  Sometimes HA  Sometimes extreme fatigue.  Periods are regular.  Had been on OC pills in past- that made her feels emotionally distant and does not want to be on it.  Has 1 kids- no complication during pregnancy. Is 9 years old. It was a .  No problems with fertility in the past  H/o miscarriage- 2 miscarriage. ,    Weight has been stable.  Wt Readings from Last 2 Encounters:   19 88.5 kg (195 lb)   19 88.5 kg (195 lb)     She denies chest pain, shortness of breath, palpitations, constipation, diarrhea, dysphagia, joint pains.  PMH/PSH:  Past Medical History:   Diagnosis Date     Allergic rhinitis, cause unspecified      Past Surgical History:   Procedure Laterality Date     NO HISTORY OF SURGERY       Family Hx:  Family History   Problem Relation Age of Onset     Gynecology Mother         hysterectomy     Lipids Father      Heart Disease Maternal Grandfather         pacemaker     Heart Disease Paternal Grandmother         CHF     Diabetes Paternal Grandfather      Heart Disease Paternal Grandfather         MI     Thyroid Disease Sister      Depression Sister         anxiety also dx            Social Hx:  Social History     Socioeconomic History     Marital status:      Spouse name: " Not on file     Number of children: Not on file     Years of education: Not on file     Highest education level: Not on file   Occupational History     Not on file   Social Needs     Financial resource strain: Not on file     Food insecurity:     Worry: Not on file     Inability: Not on file     Transportation needs:     Medical: Not on file     Non-medical: Not on file   Tobacco Use     Smoking status: Never Smoker     Smokeless tobacco: Never Used   Substance and Sexual Activity     Alcohol use: Yes     Comment: 2-3 drinks per week     Drug use: No     Sexual activity: Never   Lifestyle     Physical activity:     Days per week: Not on file     Minutes per session: Not on file     Stress: Not on file   Relationships     Social connections:     Talks on phone: Not on file     Gets together: Not on file     Attends Jew service: Not on file     Active member of club or organization: Not on file     Attends meetings of clubs or organizations: Not on file     Relationship status: Not on file     Intimate partner violence:     Fear of current or ex partner: Not on file     Emotionally abused: Not on file     Physically abused: Not on file     Forced sexual activity: Not on file   Other Topics Concern     Not on file   Social History Narrative    Caffeine intake/servings daily - 2    Calcium intake/servings daily - 2    Exercise 3 times weekly - describe running or walking-yoga    Sunscreen used - Yes    Seatbelts used - Yes    Guns stored in the home - No    Self Breast Exam - No    Pap test up to date -  Yes    Eye exam up to date -  Yes    Dental exam up to date -  No    DEXA scan up to date -  Not Applicable    Flex Sig/Colonoscopy up to date -  Not Applicable    Mammography up to date -  Not Applicable    Immunizations reviewed and up to date - No    Abuse: Current or Past (Physical, Sexual or Emotional) - No    Do you feel safe in your environment - Yes    Do you cope well with stress - Yes    Do you suffer  "from insomnia - No    Last updated by: Abi Christian  9/30/2005          MEDICATIONS:  has a current medication list which includes the following prescription(s): lamotrigine, lithium er, and sertraline.    ROS     ROS: 10 point ROS neg other than the symptoms noted above in the HPI.    Physical Exam   VS: /74   Pulse 82   Temp 99.2  F (37.3  C) (Tympanic)   Resp 16   Ht 1.753 m (5' 9\")   Wt 88.5 kg (195 lb)   SpO2 98%   BMI 28.80 kg/m    GENERAL: AXOX3, NAD, well dressed, answering questions appropriately, appears stated age.  HEENT: No exopthalmous, no proptosis, EOMI, no lig lag, no retraction  NECK: Thyroid normal in size, non tender, no nodules were palpated.  CV: RRR  LUNGS: CTAB  ABDOMEN: +BS  NEUROLOGY: CN grossly intact, no tremors  PSYCH: normal affect and mood    LABS:  Last Basic Metabolic Panel:  Lab Results   Component Value Date     10/05/2018      Lab Results   Component Value Date    POTASSIUM 4.0 10/05/2018     Lab Results   Component Value Date    CHLORIDE 106 10/05/2018     Lab Results   Component Value Date    MIKE 9.7 10/05/2018     Lab Results   Component Value Date    CO2 27 10/05/2018     Lab Results   Component Value Date    BUN 11 02/26/2019     Lab Results   Component Value Date    CR 0.75 02/26/2019     Lab Results   Component Value Date     10/05/2018       TSH   Date Value Ref Range Status   02/26/2019 2.36 0.40 - 4.00 mU/L Final   ]      All pertinent notes, labs, and images personally reviewed by me.     A/P  Ms.Alison Logan is a 39 year old here for the evaluation of:  Premenstrual symptoms:  Discussed diagnosis, pathophysiology, management and treatment options of condition with pt.  Plan to get labs as noted below  I discussed starting oral contraceptive pills but she is not interested in that  I discussed that it is mostly symptomatic management for premenstrual symptoms  Discussed serotonin specific reuptake inhibitor but given h/o bipolar disorder it " needs to be primarily managed by her psychiatrist.  Discussed wt loss and meditation.  Further workup based on labs.  Plan: Follicle stimulating hormone, Lutropin,         Estradiol        More than 50% of face to face time spent with Ms. Logan on counseling / coordinating her care.    All questions were answered.  The patient indicates understanding of the above issues and agrees with the plan set forth.       Follow-up:  Based on labs.    Dorie Bosch MD  Endocrinology   [facility]    CC: No Ref-Primary, Physician     Disclaimer: This note consists of symbols derived from keyboarding, dictation and/or voice recognition software. As a result, there may be errors in the script that have gone undetected. Please consider this when interpreting information found in this chart.    Addendum to above note and clinic visit:    Labs reviewed.    See result note/telephone encounter.

## 2019-04-08 NOTE — PATIENT INSTRUCTIONS
[facility]   Dr Bosch, Endocrinology Department      [facility]   [address] [address]  Appointment [phone]  Fax: [phone]   Monday and Tuesday         [place] [address] [place] [address]  Appointment [phone]  Fax: [phone]  Wednesday and Thursday           Labs today  Furhter work up based on that.

## 2019-04-16 ENCOUNTER — TELEPHONE (OUTPATIENT)
Dept: PEDIATRICS | Facility: CLINIC | Age: 39
End: 2019-04-16

## 2019-04-16 DIAGNOSIS — F31.81 BIPOLAR 2 DISORDER (H): Primary | ICD-10-CM

## 2019-04-16 DIAGNOSIS — F31.81 BIPOLAR 2 DISORDER (H): ICD-10-CM

## 2019-04-16 RX ORDER — SERTRALINE HYDROCHLORIDE 100 MG/1
100 TABLET, FILM COATED ORAL
Status: CANCELLED | OUTPATIENT
Start: 2019-04-16

## 2019-04-16 RX ORDER — LITHIUM CARBONATE 300 MG/1
1200 TABLET, FILM COATED, EXTENDED RELEASE ORAL
Status: CANCELLED | OUTPATIENT
Start: 2019-04-16

## 2019-04-16 RX ORDER — LAMOTRIGINE 200 MG/1
300 TABLET ORAL
Status: CANCELLED | OUTPATIENT
Start: 2019-04-16

## 2019-04-16 NOTE — TELEPHONE ENCOUNTER
Pt called to order prescriptions.  Was able to put in a request for the 3 on file but she would like to go over adding some new ones to be refilled that I couldn't see in her chart.  Please give patient a call back so she can go over these and put in the request for refill.  Call back at [phone]

## 2019-04-17 NOTE — TELEPHONE ENCOUNTER
"Requested Prescriptions   Pending Prescriptions Disp Refills     lamoTRIgine (LAMICTAL) 200 MG tablet       Sig: Take 1.5 tablets (300 mg) by mouth       Anti-Seizure Meds Protocol  Failed - 4/16/2019  6:45 PM        Failed - Review Authorizing provider's last note.      Refer to last progress notes: confirm request is for original authorizing provider (cannot be through other providers).          Failed - Normal CBC on file in past 26 months     Recent Labs   Lab Test 10/05/18  1014   WBC 13.0*   RBC 5.16   HGB 14.7   HCT 45.8                    Passed - Recent (12 mo) or future (30 days) visit within the authorizing provider's specialty     Patient had office visit in the last 12 months or has a visit in the next 30 days with authorizing provider or within the authorizing provider's specialty.  See \"Patient Info\" tab in inbasket, or \"Choose Columns\" in Meds & Orders section of the refill encounter.              Passed - Normal ALT or AST on file in past 26 months     Recent Labs   Lab Test 10/05/18  1014   ALT 42     Recent Labs   Lab Test 10/05/18  1014   AST 37             Passed - Normal platelet count on file in past 26 months     Recent Labs   Lab Test 10/05/18  1014                  Passed - Medication is active on med list        Passed - No active pregnancy on record        Passed - No positive pregnancy test in last 12 months                    sertraline (ZOLOFT) 100 MG tablet       Sig: Take 1 tablet (100 mg) by mouth       SSRIs Protocol Passed - 4/16/2019  6:45 PM        Passed - Recent (12 mo) or future (30 days) visit within the authorizing provider's specialty     Patient had office visit in the last 12 months or has a visit in the next 30 days with authorizing provider or within the authorizing provider's specialty.  See \"Patient Info\" tab in inbasket, or \"Choose Columns\" in Meds & Orders section of the refill encounter.              Passed - Medication is active on med list        " Passed - Patient is age 18 or older        Passed - No active pregnancy on record        Passed - No positive pregnancy test in last 12 months

## 2019-04-17 NOTE — TELEPHONE ENCOUNTER
Pt is out of her Lamotrigine.    Pt is requesting refills of her medications until she can establish with psychiatry.  She states that she just established care with  and Dr. Dyson informed her that she would refill her medications for about 1-3 months, in the meantime.    Rx's teed up, as requested by pt-    Please advise-    Meryl Cho RN - Triage  [facility]

## 2019-04-18 RX ORDER — ALPRAZOLAM 0.5 MG
0.5 TABLET ORAL 3 TIMES DAILY PRN
Qty: 10 TABLET | Refills: 0 | Status: SHIPPED | OUTPATIENT
Start: 2019-04-18

## 2019-04-18 RX ORDER — LITHIUM CARBONATE 300 MG/1
1200 TABLET, FILM COATED, EXTENDED RELEASE ORAL DAILY
Qty: 120 TABLET | Refills: 1 | Status: SHIPPED | OUTPATIENT
Start: 2019-04-18 | End: 2019-07-12

## 2019-04-18 RX ORDER — LAMOTRIGINE 200 MG/1
400 TABLET ORAL DAILY
Qty: 60 TABLET | Refills: 1 | Status: SHIPPED | OUTPATIENT
Start: 2019-04-18 | End: 2019-06-12

## 2019-04-18 RX ORDER — LAMOTRIGINE 200 MG/1
200 TABLET ORAL DAILY
Qty: 60 TABLET | Refills: 1 | Status: SHIPPED | OUTPATIENT
Start: 2019-04-18 | End: 2019-04-18

## 2019-04-18 RX ORDER — HYDROXYZINE HYDROCHLORIDE 10 MG/1
10 TABLET, FILM COATED ORAL EVERY 6 HOURS PRN
Qty: 120 TABLET | Refills: 1 | Status: SHIPPED | OUTPATIENT
Start: 2019-04-18 | End: 2019-06-12

## 2019-04-18 RX ORDER — SERTRALINE HYDROCHLORIDE 100 MG/1
100 TABLET, FILM COATED ORAL DAILY
Qty: 30 TABLET | Refills: 1 | Status: SHIPPED | OUTPATIENT
Start: 2019-04-18 | End: 2019-06-12

## 2019-04-18 NOTE — TELEPHONE ENCOUNTER
I need to know how much ativan she is taking daily, or how many pills she typically gets/month.  Please also check  for her.   Evelyn Dyson MD

## 2019-04-18 NOTE — TELEPHONE ENCOUNTER
Patient called back and states the Xanax was last ordered by an Urgent care provider at the [facility] in [place].  She filled this at the [facility] pharmacy.  Patient reports that she usually only needs to take this 1-2 times per year.  Did have to take one a few days ago because she ran out of her maintenance medications.  I called the [facility] pharmacy and they last filled this RX -03/20/17 for a qty of 15.   This was associated with a visit to the Urgent care, and was ordered by an Urgent care provider.  The  only goes back one year, so this could explain why it doesn't show up on the .    Please call patient when RX has been sent.  ENID Saavedra RN

## 2019-04-18 NOTE — TELEPHONE ENCOUNTER
This is a different dose then in the previous encounter.  Script sent for dose from previous encounter.   Evelyn Dyson MD

## 2019-04-18 NOTE — TELEPHONE ENCOUNTER
I tried the  twice-both times it says unable to find patient.  I called [facility] and patient has never filled Xanax with them.  Records going back to August 2018.    LMTCB at [facility] to inquire about prior dose of Xanax.  LMTCB for patient to inquire who prescribed this in the past or where she last got this filled?  Per Dr. Dyson, we need to confirm dosing, or Dr. Dyson will not be able to prescribe this medication.  ENID Saavedra RN

## 2019-04-18 NOTE — TELEPHONE ENCOUNTER
Routing refill request to provider for review/approval because:  Labs out of range:  CBC  Rosa ROSSI RN - Triage  [facility]

## 2019-05-03 ENCOUNTER — HEALTH MAINTENANCE LETTER (OUTPATIENT)
Age: 39
End: 2019-05-03

## 2019-06-12 DIAGNOSIS — F31.81 BIPOLAR 2 DISORDER (H): ICD-10-CM

## 2019-06-12 NOTE — TELEPHONE ENCOUNTER
"Requested Prescriptions   Pending Prescriptions Disp Refills     lamoTRIgine (LAMICTAL) 200 MG tablet [Pharmacy Med Name: LAMOTRIGINE 200MG TABS]    Last Written Prescription Date:  4/18/2019  Last Fill Quantity: 60,  # refills: 1   Last office visit: 4/5/2019 with prescribing provider:  No Ref-Primary, Physician     Future Office Visit:     60 tablet 1     Sig: TAKE TWO TABLETS BY MOUTH EVERY DAY       Anti-Seizure Meds Protocol  Failed - 6/12/2019  3:29 AM        Failed - Review Authorizing provider's last note.      Refer to last progress notes: confirm request is for original authorizing provider (cannot be through other providers).          Failed - Normal CBC on file in past 26 months     Recent Labs   Lab Test 10/05/18  1014   WBC 13.0*   RBC 5.16   HGB 14.7   HCT 45.8                    Passed - Recent (12 mo) or future (30 days) visit within the authorizing provider's specialty     Patient had office visit in the last 12 months or has a visit in the next 30 days with authorizing provider or within the authorizing provider's specialty.  See \"Patient Info\" tab in inbasket, or \"Choose Columns\" in Meds & Orders section of the refill encounter.              Passed - Normal ALT or AST on file in past 26 months     Recent Labs   Lab Test 10/05/18  1014   ALT 42     Recent Labs   Lab Test 10/05/18  1014   AST 37             Passed - Normal platelet count on file in past 26 months     Recent Labs   Lab Test 10/05/18  1014                  Passed - Medication is active on med list        Passed - No active pregnancy on record        Passed - No positive pregnancy test in last 12 months        hydrOXYzine (ATARAX) 10 MG tablet [Pharmacy Med Name: HYDROXYZINE HCL 10MG TABS]  Last Written Prescription Date:  4/18/2019  Last Fill Quantity: 120,  # refills: 1   Last office visit: 4/5/2019 with prescribing provider:  No Ref-Primary, Physician     Future Office Visit:     120 tablet 1     Sig: TAKE ONE TABLET " "BY MOUTH EVERY 6 HOURS AS NEEDED FOR ANXIETY/ITCHING       Antihistamines Protocol Passed - 6/12/2019  3:29 AM        Passed - Recent (12 mo) or future (30 days) visit within the authorizing provider's specialty     Patient had office visit in the last 12 months or has a visit in the next 30 days with authorizing provider or within the authorizing provider's specialty.  See \"Patient Info\" tab in inbasket, or \"Choose Columns\" in Meds & Orders section of the refill encounter.              Passed - Patient is age 3 or older     Apply age and/or weight-based dosing for peds patients age 3 and older.    Forward request to provider for patients under the age of 3.          Passed - Medication is active on med list        sertraline (ZOLOFT) 100 MG tablet [Pharmacy Med Name: SERTRALINE HCL 100MG TABS] 30 tablet 1     Sig: TAKE ONE TABLET BY MOUTH EVERY DAY       SSRIs Protocol Passed - 6/12/2019  3:29 AM        Passed - Recent (12 mo) or future (30 days) visit within the authorizing provider's specialty     Patient had office visit in the last 12 months or has a visit in the next 30 days with authorizing provider or within the authorizing provider's specialty.  See \"Patient Info\" tab in inbasket, or \"Choose Columns\" in Meds & Orders section of the refill encounter.              Passed - Medication is active on med list        Passed - Patient is age 18 or older        Passed - No active pregnancy on record        Passed - No positive pregnancy test in last 12 months          "

## 2019-06-13 RX ORDER — HYDROXYZINE HYDROCHLORIDE 10 MG/1
TABLET, FILM COATED ORAL
Qty: 120 TABLET | Refills: 1 | Status: SHIPPED | OUTPATIENT
Start: 2019-06-13 | End: 2020-01-14

## 2019-06-13 RX ORDER — SERTRALINE HYDROCHLORIDE 100 MG/1
TABLET, FILM COATED ORAL
Qty: 30 TABLET | Refills: 1 | Status: SHIPPED | OUTPATIENT
Start: 2019-06-13 | End: 2020-01-14

## 2019-06-13 RX ORDER — LAMOTRIGINE 200 MG/1
TABLET ORAL
Qty: 60 TABLET | Refills: 0 | Status: SHIPPED | OUTPATIENT
Start: 2019-06-13 | End: 2019-07-14

## 2019-06-13 NOTE — TELEPHONE ENCOUNTER
I called and spoke to the pt and she stated that she has an appointment on 7/10/19 with Elvira Leavitt at the [facility](Phone number [phone]).   Stacia Martinez on 6/13/2019 at 3:18 PM

## 2019-06-13 NOTE — TELEPHONE ENCOUNTER
Please call the patient.  She was to schedule an appointment with a psychiatric provider to refill her meds.  Please see if she has an appointment, and if so, when.    Evelyn Dyson MD

## 2019-07-12 DIAGNOSIS — F31.81 BIPOLAR 2 DISORDER (H): ICD-10-CM

## 2019-07-12 NOTE — TELEPHONE ENCOUNTER
Requested Prescriptions   Pending Prescriptions Disp Refills     lithium ER (LITHOBID) 300 MG CR tablet [Pharmacy Med Name: LITHIUM CARBONATE 300MG TBCR]  Last Written Prescription Date:  04/18/2019  Last Fill Quantity: 120 tablet,  # refills: 1   Last Office Visit: 4/5/2019 Evelyn Dyson MD   Future Office Visit:      120 tablet 1     Sig: TAKE FOUR TABLETS BY MOUTH EVERY DAY       There is no refill protocol information for this order

## 2019-07-14 DIAGNOSIS — F31.81 BIPOLAR 2 DISORDER (H): ICD-10-CM

## 2019-07-14 NOTE — TELEPHONE ENCOUNTER
"Requested Prescriptions   Pending Prescriptions Disp Refills     lamoTRIgine (LAMICTAL) 200 MG tablet [Pharmacy Med Name: LAMOTRIGINE 200MG TABS]  Last Written Prescription Date:  06/13/2019  Last Fill Quantity: 60 tablet,  # refills: 0   Last Office Visit: 4/5/2019 Evelyn Dyson MD   Future Office Visit:      60 tablet 0     Sig: TAKE TWO TABLETS BY MOUTH ONCE DAILY       Anti-Seizure Meds Protocol  Failed - 7/14/2019  3:35 AM        Failed - Review Authorizing provider's last note.      Refer to last progress notes: confirm request is for original authorizing provider (cannot be through other providers).          Failed - Normal CBC on file in past 26 months     Recent Labs   Lab Test 10/05/18  1014   WBC 13.0*   RBC 5.16   HGB 14.7   HCT 45.8                    Passed - Recent (12 mo) or future (30 days) visit within the authorizing provider's specialty     Patient had office visit in the last 12 months or has a visit in the next 30 days with authorizing provider or within the authorizing provider's specialty.  See \"Patient Info\" tab in inbasket, or \"Choose Columns\" in Meds & Orders section of the refill encounter.              Passed - Normal ALT or AST on file in past 26 months     Recent Labs   Lab Test 10/05/18  1014   ALT 42     Recent Labs   Lab Test 10/05/18  1014   AST 37             Passed - Normal platelet count on file in past 26 months     Recent Labs   Lab Test 10/05/18  1014                  Passed - Medication is active on med list        Passed - No active pregnancy on record        Passed - No positive pregnancy test in last 12 months          "

## 2019-07-15 NOTE — TELEPHONE ENCOUNTER
Routing refill request to provider for review/approval because:  Last filled 6/13/19, then supposed to be prescribed by Psychiatry.    Gudelia Doe, BSN, RN

## 2019-07-15 NOTE — TELEPHONE ENCOUNTER
Spoke to patient and she said she was switching these medications over to her psychologist that she will begin to see at the end of July. Patient stated provider stated in April she would give 2 refills and if she wasn't able to get in provider would give her 1 more.   Patient has an appointment July 29th: with a primary provider psych for psych meds.     Routing refill request to provider for review/approval because:  Drug not on the [facility] refill protocol-antimanic  Last fill for 1 month fill with 1 refill on 4/18/19.     4/5/19: established care visit:  (F31.13) Bipolar 2 disorder (H)  (primary encounter diagnosis)  --  Discussed with patient I am not comfortable prescribing long term psych medications for bipolar   -- also discussed that our MTM does not have that experience as well   -- stressed to patient she needs to see psych NP or psychiatrist for medications   -- recommended [facility] but would need to see therapist; fv psych but is in [place] where current CNS is   -- will give enough refill until she is able to get back into psych NP   -- would recommended she continues with psych NP   -- patient to [facility] with medication dose     Instructions   Return in about 1 month (around 5/5/2019) for Physical Exam.     Jelena Lamar RN Flex

## 2019-07-16 RX ORDER — LITHIUM CARBONATE 300 MG/1
TABLET, FILM COATED, EXTENDED RELEASE ORAL
Qty: 120 TABLET | Refills: 0 | Status: SHIPPED | OUTPATIENT
Start: 2019-07-16 | End: 2020-01-14

## 2019-07-16 RX ORDER — LAMOTRIGINE 200 MG/1
TABLET ORAL
Qty: 60 TABLET | Refills: 0 | Status: SHIPPED | OUTPATIENT
Start: 2019-07-16 | End: 2020-01-14

## 2019-08-10 DIAGNOSIS — F31.81 BIPOLAR 2 DISORDER (H): ICD-10-CM

## 2019-08-10 NOTE — TELEPHONE ENCOUNTER
"Requested Prescriptions   Pending Prescriptions Disp Refills     lithium ER (LITHOBID) 300 MG CR tablet [Pharmacy Med Name: LITHIUM CARBONATE 300MG TBCR]  Last Written Prescription Date:  07/16/2019  Last Fill Quantity: 120 tablet,  # refills: 0   Last Office Visit: 4/5/2019 Evelyn Dyson MD   Future Office Visit:      120 tablet 0     Sig: TAKE FOUR TABLETS BY MOUTH EVERY DAY       There is no refill protocol information for this order        lamoTRIgine (LAMICTAL) 200 MG tablet [Pharmacy Med Name: LAMOTRIGINE 200MG TABS]  Last Written Prescription Date:  07/16/2019  Last Fill Quantity: 60 tablet,  # refills: 0   Last Office Visit: 4/5/2019 Evelyn Dyson MD   Future Office Visit:      60 tablet 0     Sig: TAKE TWO TABLETS BY MOUTH EVERY DAY       Anti-Seizure Meds Protocol  Failed - 8/10/2019  3:27 AM        Failed - Review Authorizing provider's last note.      Refer to last progress notes: confirm request is for original authorizing provider (cannot be through other providers).          Failed - Normal CBC on file in past 26 months     Recent Labs   Lab Test 10/05/18  1014   WBC 13.0*   RBC 5.16   HGB 14.7   HCT 45.8                    Passed - Recent (12 mo) or future (30 days) visit within the authorizing provider's specialty     Patient had office visit in the last 12 months or has a visit in the next 30 days with authorizing provider or within the authorizing provider's specialty.  See \"Patient Info\" tab in inbasket, or \"Choose Columns\" in Meds & Orders section of the refill encounter.              Passed - Normal ALT or AST on file in past 26 months     Recent Labs   Lab Test 10/05/18  1014   ALT 42     Recent Labs   Lab Test 10/05/18  1014   AST 37             Passed - Normal platelet count on file in past 26 months     Recent Labs   Lab Test 10/05/18  1014                  Passed - Medication is active on med list        Passed - No active pregnancy on record        " "Passed - No positive pregnancy test in last 12 months        hydrOXYzine (ATARAX) 10 MG tablet [Pharmacy Med Name: HYDROXYZINE HCL 10MG TABS]  Last Written Prescription Date:  06/13/2019  Last Fill Quantity: 120 tablet,  # refills: 1   Last Office Visit: 4/5/2019 Evelyn Dyson MD   Future Office Visit:      120 tablet 1     Sig: TAKE 1 TABLET BY MOUTH EVERY SIX HOURS AS NEEDED FOR ANXIETY/ITCHING       Antihistamines Protocol Passed - 8/10/2019  3:27 AM        Passed - Recent (12 mo) or future (30 days) visit within the authorizing provider's specialty     Patient had office visit in the last 12 months or has a visit in the next 30 days with authorizing provider or within the authorizing provider's specialty.  See \"Patient Info\" tab in inbasket, or \"Choose Columns\" in Meds & Orders section of the refill encounter.              Passed - Patient is age 3 or older     Apply age and/or weight-based dosing for peds patients age 3 and older.    Forward request to provider for patients under the age of 3.          Passed - Medication is active on med list        sertraline (ZOLOFT) 100 MG tablet [Pharmacy Med Name: SERTRALINE HCL 100MG TABS]  Last Written Prescription Date:  06/13/2019  Last Fill Quantity: 30 tablet,  # refills: 1   Last Office Visit: 4/5/2019 Evelyn Dyson MD   Future Office Visit:      30 tablet 1     Sig: TAKE 1 TABLET BY MOUTH ONCE DAILY       SSRIs Protocol Passed - 8/10/2019  3:27 AM        Passed - Recent (12 mo) or future (30 days) visit within the authorizing provider's specialty     Patient had office visit in the last 12 months or has a visit in the next 30 days with authorizing provider or within the authorizing provider's specialty.  See \"Patient Info\" tab in inbasket, or \"Choose Columns\" in Meds & Orders section of the refill encounter.              Passed - Medication is active on med list        Passed - Patient is age 18 or older        Passed - No active pregnancy " on record        Passed - No positive pregnancy test in last 12 months

## 2019-08-12 NOTE — TELEPHONE ENCOUNTER
Routing refill request to provider for review/approval because:  Lithium not on Refill Protocol  Hydroxyzine not on Refill Protocol for associated dx of bipolar disorder.  Labs required (CBC) for Refill Protocol authorization of lamotrigine not within normal range when last checked.    Gudelia Doe, CHLOEN, RN

## 2019-08-12 NOTE — TELEPHONE ENCOUNTER
No further refills.  Per message on 7/12 patient was to have an appointment with psychiatry the end of July.   Evelyn Dyson MD

## 2019-08-17 RX ORDER — LITHIUM CARBONATE 300 MG/1
TABLET, FILM COATED, EXTENDED RELEASE ORAL
Qty: 120 TABLET | Refills: 0 | OUTPATIENT
Start: 2019-08-17

## 2019-08-17 RX ORDER — HYDROXYZINE HYDROCHLORIDE 10 MG/1
TABLET, FILM COATED ORAL
Qty: 120 TABLET | Refills: 1 | OUTPATIENT
Start: 2019-08-17

## 2019-08-17 RX ORDER — SERTRALINE HYDROCHLORIDE 100 MG/1
TABLET, FILM COATED ORAL
Qty: 30 TABLET | Refills: 1 | OUTPATIENT
Start: 2019-08-17

## 2019-08-17 RX ORDER — LAMOTRIGINE 200 MG/1
TABLET ORAL
Qty: 60 TABLET | Refills: 0 | OUTPATIENT
Start: 2019-08-17

## 2019-11-06 ENCOUNTER — HEALTH MAINTENANCE LETTER (OUTPATIENT)
Age: 39
End: 2019-11-06

## 2019-11-14 ENCOUNTER — OFFICE VISIT (OUTPATIENT)
Dept: URGENT CARE | Facility: URGENT CARE | Age: 39
End: 2019-11-14
Payer: COMMERCIAL

## 2019-11-14 VITALS
TEMPERATURE: 98.8 F | OXYGEN SATURATION: 99 % | BODY MASS INDEX: 29.62 KG/M2 | SYSTOLIC BLOOD PRESSURE: 118 MMHG | DIASTOLIC BLOOD PRESSURE: 84 MMHG | RESPIRATION RATE: 16 BRPM | HEART RATE: 119 BPM | HEIGHT: 69 IN | WEIGHT: 200 LBS

## 2019-11-14 DIAGNOSIS — R11.0 NAUSEA: ICD-10-CM

## 2019-11-14 DIAGNOSIS — R10.13 ABDOMINAL PAIN, EPIGASTRIC: Primary | ICD-10-CM

## 2019-11-14 LAB
ALBUMIN SERPL-MCNC: 3.4 G/DL (ref 3.4–5)
ALP SERPL-CCNC: 110 U/L (ref 40–150)
ALT SERPL W P-5'-P-CCNC: 49 U/L (ref 0–50)
ANION GAP SERPL CALCULATED.3IONS-SCNC: 16 MMOL/L (ref 3–14)
AST SERPL W P-5'-P-CCNC: 35 U/L (ref 0–45)
BASOPHILS # BLD AUTO: 0.1 10E9/L (ref 0–0.2)
BASOPHILS NFR BLD AUTO: 0.6 %
BILIRUB SERPL-MCNC: 0.6 MG/DL (ref 0.2–1.3)
BUN SERPL-MCNC: 15 MG/DL (ref 7–30)
CALCIUM SERPL-MCNC: 8.7 MG/DL (ref 8.5–10.1)
CHLORIDE SERPL-SCNC: 100 MMOL/L (ref 94–109)
CO2 SERPL-SCNC: 26 MMOL/L (ref 20–32)
CREAT SERPL-MCNC: 1 MG/DL (ref 0.52–1.04)
DIFFERENTIAL METHOD BLD: ABNORMAL
EOSINOPHIL # BLD AUTO: 0.5 10E9/L (ref 0–0.7)
EOSINOPHIL NFR BLD AUTO: 3.9 %
ERYTHROCYTE [DISTWIDTH] IN BLOOD BY AUTOMATED COUNT: 12.9 % (ref 10–15)
GFR SERPL CREATININE-BSD FRML MDRD: 70 ML/MIN/{1.73_M2}
GLUCOSE SERPL-MCNC: 129 MG/DL (ref 70–99)
HCT VFR BLD AUTO: 46.3 % (ref 35–47)
HGB BLD-MCNC: 14.7 G/DL (ref 11.7–15.7)
LIPASE SERPL-CCNC: 101 U/L (ref 73–393)
LYMPHOCYTES # BLD AUTO: 3.2 10E9/L (ref 0.8–5.3)
LYMPHOCYTES NFR BLD AUTO: 26.4 %
MCH RBC QN AUTO: 27.8 PG (ref 26.5–33)
MCHC RBC AUTO-ENTMCNC: 31.7 G/DL (ref 31.5–36.5)
MCV RBC AUTO: 88 FL (ref 78–100)
MONOCYTES # BLD AUTO: 0.8 10E9/L (ref 0–1.3)
MONOCYTES NFR BLD AUTO: 7 %
NEUTROPHILS # BLD AUTO: 7.4 10E9/L (ref 1.6–8.3)
NEUTROPHILS NFR BLD AUTO: 62.1 %
PLATELET # BLD AUTO: 365 10E9/L (ref 150–450)
POTASSIUM SERPL-SCNC: 3.6 MMOL/L (ref 3.4–5.3)
PROT SERPL-MCNC: 7.3 G/DL (ref 6.8–8.8)
RBC # BLD AUTO: 5.29 10E12/L (ref 3.8–5.2)
SODIUM SERPL-SCNC: 142 MMOL/L (ref 133–144)
WBC # BLD AUTO: 11.9 10E9/L (ref 4–11)

## 2019-11-14 PROCEDURE — 36415 COLL VENOUS BLD VENIPUNCTURE: CPT | Performed by: FAMILY MEDICINE

## 2019-11-14 PROCEDURE — 85025 COMPLETE CBC W/AUTO DIFF WBC: CPT | Performed by: FAMILY MEDICINE

## 2019-11-14 PROCEDURE — 83690 ASSAY OF LIPASE: CPT | Performed by: FAMILY MEDICINE

## 2019-11-14 PROCEDURE — 99214 OFFICE O/P EST MOD 30 MIN: CPT | Performed by: FAMILY MEDICINE

## 2019-11-14 PROCEDURE — 80053 COMPREHEN METABOLIC PANEL: CPT | Performed by: FAMILY MEDICINE

## 2019-11-14 PROCEDURE — 82150 ASSAY OF AMYLASE: CPT | Performed by: FAMILY MEDICINE

## 2019-11-14 RX ORDER — ONDANSETRON 4 MG/1
4 TABLET, ORALLY DISINTEGRATING ORAL EVERY 8 HOURS PRN
Qty: 12 TABLET | Refills: 0 | Status: SHIPPED | OUTPATIENT
Start: 2019-11-14

## 2019-11-14 ASSESSMENT — MIFFLIN-ST. JEOR: SCORE: 1641.57

## 2019-11-14 ASSESSMENT — PAIN SCALES - GENERAL: PAINLEVEL: EXTREME PAIN (8)

## 2019-11-14 NOTE — PROGRESS NOTES
"SUBJECTIVE  HPI:  Katrin Logan is a 39 year old female who presents with the CC of abdominal pain.    Patient has been struggling with abdominal distention during time with ovulation.  Patient states that will get SOB with the distention, will develop more acid reflux symptoms that will get bad enough that she will throw up.  This will resolve within a week and recur again at the next cycle.  Patient states that this has been ongoing for the past 1 year.  She had seen Endocrinologist and had some labs done and told that she was not in menopause, recommend to start OCP which patient is not keen on at this time.  Patient wants imaging of her abdomen or scope today.    Past Medical History:   Diagnosis Date     Allergic rhinitis, cause unspecified      Current Outpatient Medications   Medication Sig Dispense Refill     ALPRAZolam (XANAX) 0.5 MG tablet Take 1 tablet (0.5 mg) by mouth 3 times daily as needed for anxiety 10 tablet 0     hydrOXYzine (ATARAX) 10 MG tablet TAKE ONE TABLET BY MOUTH EVERY 6 HOURS AS NEEDED FOR ANXIETY/ITCHING 120 tablet 1     ondansetron (ZOFRAN ODT) 4 MG ODT tab Take 1 tablet (4 mg) by mouth every 8 hours as needed for nausea 12 tablet 0     sertraline (ZOLOFT) 100 MG tablet TAKE ONE TABLET BY MOUTH EVERY DAY 30 tablet 1     lamoTRIgine (LAMICTAL) 200 MG tablet TAKE TWO TABLETS BY MOUTH ONCE DAILY (Patient not taking: Reported on 11/14/2019) 60 tablet 0     lithium ER (LITHOBID) 300 MG CR tablet TAKE FOUR TABLETS BY MOUTH EVERY DAY (Patient not taking: Reported on 11/14/2019) 120 tablet 0     Social History     Tobacco Use     Smoking status: Never Smoker     Smokeless tobacco: Never Used   Substance Use Topics     Alcohol use: Yes     Comment: 2-3 drinks per week       ROS:  Review of systems negative except as stated above.    OBJECTIVE:  /84   Pulse 119   Temp 98.8  F (37.1  C) (Tympanic)   Resp 16   Ht 1.753 m (5' 9\")   Wt 90.7 kg (200 lb)   LMP 10/28/2019 (Approximate)   " SpO2 99%   BMI 29.53 kg/m    GENERAL APPEARANCE: healthy, alert and no distress  PSYCH: mentation appears normal and affect normal/bright    Results for orders placed or performed in visit on 11/14/19   CBC with platelets and differential     Status: Abnormal   Result Value Ref Range    WBC 11.9 (H) 4.0 - 11.0 10e9/L    RBC Count 5.29 (H) 3.8 - 5.2 10e12/L    Hemoglobin 14.7 11.7 - 15.7 g/dL    Hematocrit 46.3 35.0 - 47.0 %    MCV 88 78 - 100 fl    MCH 27.8 26.5 - 33.0 pg    MCHC 31.7 31.5 - 36.5 g/dL    RDW 12.9 10.0 - 15.0 %    Platelet Count 365 150 - 450 10e9/L    % Neutrophils 62.1 %    % Lymphocytes 26.4 %    % Monocytes 7.0 %    % Eosinophils 3.9 %    % Basophils 0.6 %    Absolute Neutrophil 7.4 1.6 - 8.3 10e9/L    Absolute Lymphocytes 3.2 0.8 - 5.3 10e9/L    Absolute Monocytes 0.8 0.0 - 1.3 10e9/L    Absolute Eosinophils 0.5 0.0 - 0.7 10e9/L    Absolute Basophils 0.1 0.0 - 0.2 10e9/L    Diff Method Automated Method    Comprehensive metabolic panel     Status: Abnormal   Result Value Ref Range    Sodium 142 133 - 144 mmol/L    Potassium 3.6 3.4 - 5.3 mmol/L    Chloride 100 94 - 109 mmol/L    Carbon Dioxide 26 20 - 32 mmol/L    Anion Gap 16 (H) 3 - 14 mmol/L    Glucose 129 (H) 70 - 99 mg/dL    Urea Nitrogen 15 7 - 30 mg/dL    Creatinine 1.00 0.52 - 1.04 mg/dL    GFR Estimate 70 >60 mL/min/[1.73_m2]    GFR Estimate If Black 81 >60 mL/min/[1.73_m2]    Calcium 8.7 8.5 - 10.1 mg/dL    Bilirubin Total 0.6 0.2 - 1.3 mg/dL    Albumin 3.4 3.4 - 5.0 g/dL    Protein Total 7.3 6.8 - 8.8 g/dL    Alkaline Phosphatase 110 40 - 150 U/L    ALT 49 0 - 50 U/L    AST 35 0 - 45 U/L         ASSESSMENT/PLAN:  (R10.13) Abdominal pain, epigastric  (primary encounter diagnosis)  Plan: CBC with platelets and differential,         Comprehensive metabolic panel, Lipase, Amylase            (R11.0) Nausea  Plan: ondansetron (ZOFRAN ODT) 4 MG ODT tab            Patient here today with abdominal pain/distention and nausea, cyclical in  nature and seems to be occurring with menstrual cycle and desires imaging studies.  Discussed with patient that further evaluation and treatment will need to be thru primary provider.  Discussed urgent care evaluation and work up capabilities with labs and Xray.  Discussed initial labs with patient, will follow up on pending labs and notify if any abnormalities.  RX zofran given to help with nausea symptoms.    Follow up with primary provider for further evaluation in 1-2 weeks.    Carlin Rodriguez MD, MD  November 14, 2019 4:15 PM

## 2019-11-15 LAB — AMYLASE SERPL-CCNC: 36 U/L (ref 30–110)

## 2019-12-30 ENCOUNTER — MYC MEDICAL ADVICE (OUTPATIENT)
Dept: PEDIATRICS | Facility: CLINIC | Age: 39
End: 2019-12-30

## 2019-12-30 ENCOUNTER — E-VISIT (OUTPATIENT)
Dept: PEDIATRICS | Facility: CLINIC | Age: 39
End: 2019-12-30

## 2019-12-30 DIAGNOSIS — R14.0 BLOATING: Primary | ICD-10-CM

## 2019-12-30 PROCEDURE — 99207 ZZC NO BILLABLE SERVICE THIS VISIT: CPT | Performed by: NURSE PRACTITIONER

## 2019-12-30 NOTE — TELEPHONE ENCOUNTER
I recommend being seen in clinic. She has normal calcium and I'm not sure what she is looking for with checking PTH. Per review of UC visit in [facility], may need imaging.

## 2020-01-14 ENCOUNTER — HOSPITAL ENCOUNTER (OUTPATIENT)
Dept: MAMMOGRAPHY | Facility: CLINIC | Age: 40
Discharge: HOME | End: 2020-01-14
Attending: NURSE PRACTITIONER | Admitting: NURSE PRACTITIONER
Payer: COMMERCIAL

## 2020-01-14 ENCOUNTER — OFFICE VISIT (OUTPATIENT)
Dept: INTERNAL MEDICINE | Facility: CLINIC | Age: 40
End: 2020-01-14
Payer: COMMERCIAL

## 2020-01-14 VITALS
HEIGHT: 67 IN | BODY MASS INDEX: 31.32 KG/M2 | SYSTOLIC BLOOD PRESSURE: 102 MMHG | TEMPERATURE: 98.3 F | HEART RATE: 100 BPM | OXYGEN SATURATION: 95 % | DIASTOLIC BLOOD PRESSURE: 86 MMHG | RESPIRATION RATE: 16 BRPM

## 2020-01-14 DIAGNOSIS — R73.09 ELEVATED GLUCOSE: ICD-10-CM

## 2020-01-14 DIAGNOSIS — Z12.31 ENCOUNTER FOR SCREENING MAMMOGRAM FOR BREAST CANCER: ICD-10-CM

## 2020-01-14 DIAGNOSIS — Z79.899 ENCOUNTER FOR LONG-TERM (CURRENT) USE OF MEDICATIONS: ICD-10-CM

## 2020-01-14 DIAGNOSIS — F31.81 BIPOLAR 2 DISORDER (H): ICD-10-CM

## 2020-01-14 DIAGNOSIS — K21.9 GASTROESOPHAGEAL REFLUX DISEASE WITHOUT ESOPHAGITIS: ICD-10-CM

## 2020-01-14 DIAGNOSIS — Z11.51 SCREENING FOR HUMAN PAPILLOMAVIRUS: ICD-10-CM

## 2020-01-14 DIAGNOSIS — R14.0 ABDOMINAL BLOATING: ICD-10-CM

## 2020-01-14 DIAGNOSIS — Z00.01 ENCOUNTER FOR GENERAL ADULT MEDICAL EXAMINATION WITH ABNORMAL FINDINGS: Primary | ICD-10-CM

## 2020-01-14 LAB
BASOPHILS # BLD AUTO: 0.1 10E9/L (ref 0–0.2)
BASOPHILS NFR BLD AUTO: 0.8 %
DIFFERENTIAL METHOD BLD: ABNORMAL
EOSINOPHIL # BLD AUTO: 0.4 10E9/L (ref 0–0.7)
EOSINOPHIL NFR BLD AUTO: 4.8 %
ERYTHROCYTE [DISTWIDTH] IN BLOOD BY AUTOMATED COUNT: 13.3 % (ref 10–15)
HBA1C MFR BLD: 6.1 % (ref 0–5.6)
HCT VFR BLD AUTO: 46.6 % (ref 35–47)
HGB BLD-MCNC: 14.9 G/DL (ref 11.7–15.7)
LYMPHOCYTES # BLD AUTO: 2.1 10E9/L (ref 0.8–5.3)
LYMPHOCYTES NFR BLD AUTO: 26.3 %
MCH RBC QN AUTO: 27.6 PG (ref 26.5–33)
MCHC RBC AUTO-ENTMCNC: 32 G/DL (ref 31.5–36.5)
MCV RBC AUTO: 87 FL (ref 78–100)
MONOCYTES # BLD AUTO: 0.5 10E9/L (ref 0–1.3)
MONOCYTES NFR BLD AUTO: 6.7 %
NEUTROPHILS # BLD AUTO: 4.9 10E9/L (ref 1.6–8.3)
NEUTROPHILS NFR BLD AUTO: 61.4 %
PLATELET # BLD AUTO: 390 10E9/L (ref 150–450)
PTH-INTACT SERPL-MCNC: 21 PG/ML (ref 18–80)
RBC # BLD AUTO: 5.39 10E12/L (ref 3.8–5.2)
WBC # BLD AUTO: 7.9 10E9/L (ref 4–11)

## 2020-01-14 PROCEDURE — G0145 SCR C/V CYTO,THINLAYER,RESCR: HCPCS | Performed by: NURSE PRACTITIONER

## 2020-01-14 PROCEDURE — 83036 HEMOGLOBIN GLYCOSYLATED A1C: CPT | Performed by: NURSE PRACTITIONER

## 2020-01-14 PROCEDURE — 77067 SCR MAMMO BI INCL CAD: CPT

## 2020-01-14 PROCEDURE — 83970 ASSAY OF PARATHORMONE: CPT | Performed by: NURSE PRACTITIONER

## 2020-01-14 PROCEDURE — 83516 IMMUNOASSAY NONANTIBODY: CPT | Mod: 59 | Performed by: NURSE PRACTITIONER

## 2020-01-14 PROCEDURE — 82306 VITAMIN D 25 HYDROXY: CPT | Performed by: NURSE PRACTITIONER

## 2020-01-14 PROCEDURE — 36415 COLL VENOUS BLD VENIPUNCTURE: CPT | Performed by: NURSE PRACTITIONER

## 2020-01-14 PROCEDURE — 82784 ASSAY IGA/IGD/IGG/IGM EACH: CPT | Performed by: NURSE PRACTITIONER

## 2020-01-14 PROCEDURE — 99386 PREV VISIT NEW AGE 40-64: CPT | Performed by: NURSE PRACTITIONER

## 2020-01-14 PROCEDURE — 86256 FLUORESCENT ANTIBODY TITER: CPT | Mod: 90 | Performed by: NURSE PRACTITIONER

## 2020-01-14 PROCEDURE — 80050 GENERAL HEALTH PANEL: CPT | Performed by: NURSE PRACTITIONER

## 2020-01-14 PROCEDURE — 83516 IMMUNOASSAY NONANTIBODY: CPT | Performed by: NURSE PRACTITIONER

## 2020-01-14 PROCEDURE — 87624 HPV HI-RISK TYP POOLED RSLT: CPT | Performed by: NURSE PRACTITIONER

## 2020-01-14 PROCEDURE — 99000 SPECIMEN HANDLING OFFICE-LAB: CPT | Performed by: NURSE PRACTITIONER

## 2020-01-14 RX ORDER — LAMOTRIGINE 25 MG/1
50 TABLET ORAL DAILY
Qty: 60 TABLET | Refills: 3 | Status: SHIPPED | OUTPATIENT
Start: 2020-01-14 | End: 2020-06-22

## 2020-01-14 RX ORDER — LAMOTRIGINE 50 MG/1
50 TABLET, ORALLY DISINTEGRATING ORAL 2 TIMES DAILY
COMMUNITY
End: 2020-01-14

## 2020-01-14 RX ORDER — HYDROXYZINE HYDROCHLORIDE 10 MG/1
TABLET, FILM COATED ORAL
Qty: 120 TABLET | Refills: 1 | Status: SHIPPED | OUTPATIENT
Start: 2020-01-14

## 2020-01-14 ASSESSMENT — ENCOUNTER SYMPTOMS
COUGH: 1
CONSTIPATION: 0
NERVOUS/ANXIOUS: 0
FREQUENCY: 0
FEVER: 0
HEMATOCHEZIA: 0
CHILLS: 1
EYE PAIN: 0
DIZZINESS: 0
HEMATURIA: 0
ABDOMINAL PAIN: 1
DIARRHEA: 0

## 2020-01-14 ASSESSMENT — ANXIETY QUESTIONNAIRES
5. BEING SO RESTLESS THAT IT IS HARD TO SIT STILL: NOT AT ALL
1. FEELING NERVOUS, ANXIOUS, OR ON EDGE: NOT AT ALL
IF YOU CHECKED OFF ANY PROBLEMS ON THIS QUESTIONNAIRE, HOW DIFFICULT HAVE THESE PROBLEMS MADE IT FOR YOU TO DO YOUR WORK, TAKE CARE OF THINGS AT HOME, OR GET ALONG WITH OTHER PEOPLE: SOMEWHAT DIFFICULT
3. WORRYING TOO MUCH ABOUT DIFFERENT THINGS: NOT AT ALL
2. NOT BEING ABLE TO STOP OR CONTROL WORRYING: NOT AT ALL
GAD7 TOTAL SCORE: 2
7. FEELING AFRAID AS IF SOMETHING AWFUL MIGHT HAPPEN: SEVERAL DAYS
6. BECOMING EASILY ANNOYED OR IRRITABLE: SEVERAL DAYS

## 2020-01-14 ASSESSMENT — PATIENT HEALTH QUESTIONNAIRE - PHQ9
5. POOR APPETITE OR OVEREATING: NOT AT ALL
SUM OF ALL RESPONSES TO PHQ QUESTIONS 1-9: 7

## 2020-01-14 NOTE — NURSING NOTE
"Vital signs:  Temp: 98.3  F (36.8  C) Temp src: Oral BP: 102/86 Pulse: 100   Resp: 16 SpO2: 95 %     Height: 170.2 cm (5' 7\") Weight: (pt decline)  Estimated body mass index is 31.32 kg/m  as calculated from the following:    Height as of this encounter: 1.702 m (5' 7\").    Weight as of 11/14/19: 90.7 kg (200 lb).          "
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
## 2020-01-14 NOTE — PATIENT INSTRUCTIONS
Lab  [address]     [facility]   [phone]  Call for an appointment     To schedule your mammogram, you may call the breast center at [phone].

## 2020-01-15 LAB
ALBUMIN SERPL-MCNC: 3.9 G/DL (ref 3.4–5)
ALP SERPL-CCNC: 108 U/L (ref 40–150)
ALT SERPL W P-5'-P-CCNC: 88 U/L (ref 0–50)
ANION GAP SERPL CALCULATED.3IONS-SCNC: 8 MMOL/L (ref 3–14)
AST SERPL W P-5'-P-CCNC: 42 U/L (ref 0–45)
BILIRUB SERPL-MCNC: 0.5 MG/DL (ref 0.2–1.3)
BUN SERPL-MCNC: 10 MG/DL (ref 7–30)
CALCIUM SERPL-MCNC: 8.9 MG/DL (ref 8.5–10.1)
CHLORIDE SERPL-SCNC: 105 MMOL/L (ref 94–109)
CO2 SERPL-SCNC: 22 MMOL/L (ref 20–32)
CREAT SERPL-MCNC: 0.74 MG/DL (ref 0.52–1.04)
DEPRECATED CALCIDIOL+CALCIFEROL SERPL-MC: 23 UG/L (ref 20–75)
GFR SERPL CREATININE-BSD FRML MDRD: >90 ML/MIN/{1.73_M2}
GLIADIN IGA SER-ACNC: 2 U/ML
GLIADIN IGG SER-ACNC: <1 U/ML
GLUCOSE SERPL-MCNC: 139 MG/DL (ref 70–99)
IGA SERPL-MCNC: 191 MG/DL (ref 84–499)
POTASSIUM SERPL-SCNC: 3.9 MMOL/L (ref 3.4–5.3)
PROT SERPL-MCNC: 7.4 G/DL (ref 6.8–8.8)
SODIUM SERPL-SCNC: 135 MMOL/L (ref 133–144)
TSH SERPL DL<=0.005 MIU/L-ACNC: 0.84 MU/L (ref 0.4–4)
TTG IGA SER-ACNC: 1 U/ML
TTG IGG SER-ACNC: <1 U/ML

## 2020-01-15 ASSESSMENT — ANXIETY QUESTIONNAIRES: GAD7 TOTAL SCORE: 2

## 2020-01-17 LAB
COPATH REPORT: NORMAL
ENDOMYSIUM IGA TITR SER IF: NORMAL {TITER}
PAP: NORMAL

## 2020-01-21 ENCOUNTER — HOSPITAL ENCOUNTER (OUTPATIENT)
Dept: MAMMOGRAPHY | Facility: CLINIC | Age: 40
Discharge: HOME | End: 2020-01-21
Attending: NURSE PRACTITIONER | Admitting: NURSE PRACTITIONER

## 2020-01-21 ENCOUNTER — HOSPITAL ENCOUNTER (OUTPATIENT)
Dept: ULTRASOUND IMAGING | Facility: CLINIC | Age: 40
End: 2020-01-21
Attending: NURSE PRACTITIONER

## 2020-01-21 DIAGNOSIS — R92.8 ABNORMAL MAMMOGRAM: ICD-10-CM

## 2020-01-21 LAB
FINAL DIAGNOSIS: NORMAL
HPV HR 12 DNA CVX QL NAA+PROBE: NEGATIVE
HPV16 DNA SPEC QL NAA+PROBE: NEGATIVE
HPV18 DNA SPEC QL NAA+PROBE: NEGATIVE
SPECIMEN DESCRIPTION: NORMAL
SPECIMEN SOURCE CVX/VAG CYTO: NORMAL

## 2020-01-21 PROCEDURE — 76642 ULTRASOUND BREAST LIMITED: CPT | Mod: 50

## 2020-01-21 PROCEDURE — 77066 DX MAMMO INCL CAD BI: CPT

## 2020-01-22 PROBLEM — R87.810 CERVICAL HIGH RISK HPV (HUMAN PAPILLOMAVIRUS) TEST POSITIVE: Status: ACTIVE | Noted: 2017-03-21

## 2020-03-04 ENCOUNTER — OFFICE VISIT (OUTPATIENT)
Dept: URGENT CARE | Facility: URGENT CARE | Age: 40
End: 2020-03-04
Payer: COMMERCIAL

## 2020-03-04 VITALS
DIASTOLIC BLOOD PRESSURE: 80 MMHG | OXYGEN SATURATION: 99 % | RESPIRATION RATE: 18 BRPM | TEMPERATURE: 99.9 F | HEART RATE: 78 BPM | SYSTOLIC BLOOD PRESSURE: 135 MMHG

## 2020-03-04 DIAGNOSIS — J10.1 INFLUENZA A: Primary | ICD-10-CM

## 2020-03-04 DIAGNOSIS — J02.9 SORE THROAT: ICD-10-CM

## 2020-03-04 LAB
DEPRECATED S PYO AG THROAT QL EIA: NEGATIVE
FLUAV+FLUBV AG SPEC QL: NEGATIVE
FLUAV+FLUBV AG SPEC QL: POSITIVE
SPECIMEN SOURCE: ABNORMAL
SPECIMEN SOURCE: NORMAL
SPECIMEN SOURCE: NORMAL
STREP GROUP A PCR: NOT DETECTED

## 2020-03-04 PROCEDURE — 87651 STREP A DNA AMP PROBE: CPT | Performed by: FAMILY MEDICINE

## 2020-03-04 PROCEDURE — 87804 INFLUENZA ASSAY W/OPTIC: CPT | Performed by: FAMILY MEDICINE

## 2020-03-04 PROCEDURE — 40001204 ZZHCL STATISTIC STREP A RAPID: Performed by: FAMILY MEDICINE

## 2020-03-04 PROCEDURE — 99213 OFFICE O/P EST LOW 20 MIN: CPT | Performed by: PHYSICIAN ASSISTANT

## 2020-03-04 RX ORDER — OSELTAMIVIR PHOSPHATE 75 MG/1
75 CAPSULE ORAL 2 TIMES DAILY
Qty: 10 CAPSULE | Refills: 0 | Status: SHIPPED | OUTPATIENT
Start: 2020-03-04 | End: 2020-05-08

## 2020-03-04 NOTE — PROGRESS NOTES
SUBJECTIVE:   Katrin Logan is a 40 year old female presenting with a chief complaint of fever, chills, cough - non-productive, sore throat, headache, body aches and fatigue.  Onset of symptoms was 1 day(s) ago.  Course of illness is same.    Severity moderate  Current and Associated symptoms: as above  Treatment measures tried include Tylenol/Ibuprofen.  Predisposing factors include exposure to strep and exposure to influenza.    Past Medical History:   Diagnosis Date     Allergic rhinitis, cause unspecified      Cervical high risk HPV (human papillomavirus) test positive 3/21/2017     Current Outpatient Medications   Medication Sig Dispense Refill     ALPRAZolam (XANAX) 0.5 MG tablet Take 1 tablet (0.5 mg) by mouth 3 times daily as needed for anxiety 10 tablet 0     hydrOXYzine (ATARAX) 10 MG tablet TAKE ONE TABLET BY MOUTH EVERY 6 HOURS AS NEEDED FOR ANXIETY/ITCHING 120 tablet 1     lamoTRIgine (LAMICTAL) 25 MG tablet Take 2 tablets (50 mg) by mouth daily 60 tablet 3     ondansetron (ZOFRAN ODT) 4 MG ODT tab Take 1 tablet (4 mg) by mouth every 8 hours as needed for nausea (Patient not taking: Reported on 1/14/2020) 12 tablet 0     Non-daily cigarette smoker    No personal or family history of asthma      ROS:  10 point ROS negative except as listed above      OBJECTIVE:  /80   Pulse 78   Temp 99.9  F (37.7  C) (Tympanic)   Resp 18   SpO2 99%   GENERAL APPEARANCE: healthy, alert and no distress  EYES: EOMI,  PERRL, conjunctiva clear  HENT: ear canals and TM's normal.  Nose and mouth without ulcers, erythema or lesions  NECK: supple, nontender, no lymphadenopathy  RESP: lungs clear to auscultation - no rales, rhonchi or wheezes  CV: regular rates and rhythm, normal S1 S2, no murmur noted  ABDOMEN:  soft, nontender, no HSM or masses and bowel sounds normal  NEURO: Normal strength and tone, sensory exam grossly normal,  normal speech and mentation  SKIN: no suspicious lesions or rashes      Results for  orders placed or performed in visit on 03/04/20   Influenza A/B antigen     Status: Abnormal   Result Value Ref Range    Influenza A/B Agn Specimen Nasal     Influenza A Positive (A) NEG^Negative    Influenza B Negative NEG^Negative   Streptococcus A Rapid Scr w Reflx to PCR     Status: None    Specimen: Throat   Result Value Ref Range    Strep Specimen Description Throat     Streptococcus Group A Rapid Screen Negative NEG^Negative   Group A Streptococcus PCR Throat Swab     Status: None    Specimen: Throat   Result Value Ref Range    Specimen Description Throat     Strep Group A PCR Not Detected NDET^Not Detected           ASSESSMENT:  (J10.1) Influenza A  (primary encounter diagnosis)  Plan: oseltamivir (TAMIFLU) 75 MG capsule, Group A         Streptococcus PCR Throat Swab         (J02.9) Sore throat  Plan: Influenza A/B antigen, Streptococcus A Rapid         Scr w Reflx to PCR, Group A Streptococcus PCR         Throat Swab          Follow up with PCP if symptoms worsen or fail to improve      Patient Instructions     Patient Education     Influenza (Adult)    Influenza is also called the flu. It is a viral illness that affects the air passages of your lungs. It is different from the common cold. The flu can easily be passed from one to person to another. It may be spread through the air by coughing and sneezing. Or it can be spread by touching the sick person and then touching your own eyes, nose, or mouth.  The flu starts 1 to 3 days after you are exposed to the flu virus. It may last for 1 to 2 weeks but many people feel tired or fatigued for many weeks afterward. You usually don t need to take antibiotics unless you have a complication. This might be an ear or sinus infection or pneumonia.  Symptoms of the flu may be mild or severe. They can include extreme tiredness (wanting to stay in bed all day), chills, fevers, muscle aches, soreness with eye movement, headache, and a dry, hacking cough.  Home  care  Follow these guidelines when caring for yourself at home:    Avoid being around cigarette smoke, whether yours or other people s.    Acetaminophen or ibuprofen will help ease your fever, muscle aches, and headache. Don t give aspirin to anyone younger than 18 who has the flu. Aspirin can harm the liver.    Nausea and loss of appetite are common with the flu. Eat light meals. Drink 6 to 8 glasses of liquids every day. Good choices are water, sport drinks, soft drinks without caffeine, juices, tea, and soup. Extra fluids will also help loosen secretions in your nose and lungs.    Over-the-counter cold medicines will not make the flu go away faster. But the medicines may help with coughing, sore throat, and congestion in your nose and sinuses. Don t use a decongestant if you have high blood pressure.    Stay home until your fever has been gone for at least 24 hours without using medicine to reduce fever.  Follow-up care  Follow up with your healthcare provider, or as advised, if you are not getting better over the next week.  If you are age 65 or older, talk with your provider about getting a pneumococcal vaccine every 5 years. You should also get this vaccine if you have chronic asthma or COPD. All adults should get a flu vaccine every fall. Ask your provider about this.  When to seek medical advice  Call your healthcare provider right away if any of these occur:    Cough with lots of colored mucus (sputum) or blood in your mucus    Chest pain, shortness of breath, wheezing, or trouble breathing    Severe headache, or face, neck, or ear pain    New rash with fever    Fever of 100.4 F (38 C) or higher, or as directed by your healthcare provider    Confusion, behavior change, or seizure    Severe weakness or dizziness    You get a new fever or cough after getting better for a few days  Date Last Reviewed: 1/1/2017 2000-2019 [employer]. [address] [facility], [place] [address]. All rights  reserved. This information is not intended as a substitute for professional medical care. Always follow your healthcare professional's instructions.

## 2020-03-04 NOTE — PATIENT INSTRUCTIONS
Patient Education     Influenza (Adult)    Influenza is also called the flu. It is a viral illness that affects the air passages of your lungs. It is different from the common cold. The flu can easily be passed from one to person to another. It may be spread through the air by coughing and sneezing. Or it can be spread by touching the sick person and then touching your own eyes, nose, or mouth.  The flu starts 1 to 3 days after you are exposed to the flu virus. It may last for 1 to 2 weeks but many people feel tired or fatigued for many weeks afterward. You usually don t need to take antibiotics unless you have a complication. This might be an ear or sinus infection or pneumonia.  Symptoms of the flu may be mild or severe. They can include extreme tiredness (wanting to stay in bed all day), chills, fevers, muscle aches, soreness with eye movement, headache, and a dry, hacking cough.  Home care  Follow these guidelines when caring for yourself at home:    Avoid being around cigarette smoke, whether yours or other people s.    Acetaminophen or ibuprofen will help ease your fever, muscle aches, and headache. Don t give aspirin to anyone younger than 18 who has the flu. Aspirin can harm the liver.    Nausea and loss of appetite are common with the flu. Eat light meals. Drink 6 to 8 glasses of liquids every day. Good choices are water, sport drinks, soft drinks without caffeine, juices, tea, and soup. Extra fluids will also help loosen secretions in your nose and lungs.    Over-the-counter cold medicines will not make the flu go away faster. But the medicines may help with coughing, sore throat, and congestion in your nose and sinuses. Don t use a decongestant if you have high blood pressure.    Stay home until your fever has been gone for at least 24 hours without using medicine to reduce fever.  Follow-up care  Follow up with your healthcare provider, or as advised, if you are not getting better over the next  week.  If you are age 65 or older, talk with your provider about getting a pneumococcal vaccine every 5 years. You should also get this vaccine if you have chronic asthma or COPD. All adults should get a flu vaccine every fall. Ask your provider about this.  When to seek medical advice  Call your healthcare provider right away if any of these occur:    Cough with lots of colored mucus (sputum) or blood in your mucus    Chest pain, shortness of breath, wheezing, or trouble breathing    Severe headache, or face, neck, or ear pain    New rash with fever    Fever of 100.4 F (38 C) or higher, or as directed by your healthcare provider    Confusion, behavior change, or seizure    Severe weakness or dizziness    You get a new fever or cough after getting better for a few days  Date Last Reviewed: 1/1/2017 2000-2019 [employer]. [address] [place] [address], [place] [address]. All rights reserved. This information is not intended as a substitute for professional medical care. Always follow your healthcare professional's instructions.

## 2020-04-15 ENCOUNTER — MYC MEDICAL ADVICE (OUTPATIENT)
Dept: INTERNAL MEDICINE | Facility: CLINIC | Age: 40
End: 2020-04-15

## 2020-04-15 DIAGNOSIS — G47.30 SLEEP APNEA, UNSPECIFIED TYPE: Primary | ICD-10-CM

## 2020-04-16 NOTE — TELEPHONE ENCOUNTER
[place]  [facility] [phone]     Patient informed via [employer].

## 2020-05-08 VITALS — WEIGHT: 195 LBS | BODY MASS INDEX: 28.88 KG/M2 | HEIGHT: 69 IN

## 2020-05-08 ASSESSMENT — MIFFLIN-ST. JEOR: SCORE: 1613.89

## 2020-05-08 NOTE — PROGRESS NOTES
"Patient decided for phone visit instead of video.      Katrin Logan is a 40 year old female who is being evaluated via a billable telephone visit.      The patient has been notified of following:     \"This telephone visit will be conducted via a call between you and your physician/provider. We have found that certain health care needs can be provided without the need for a physical exam.  This service lets us provide the care you need with a short phone conversation.  If a prescription is necessary we can send it directly to your pharmacy.  If lab work is needed we can place an order for that and you can then stop by our lab to have the test done at a later time.    Telephone visits are billed at different rates depending on your insurance coverage. During this emergency period, for some insurers they may be billed the same as an in-person visit.  Please reach out to your insurance provider with any questions.    If during the course of the call the physician/provider feels a telephone visit is not appropriate, you will not be charged for this service.\"    Patient has given verbal consent for Telephone visit?  Yes    How would you like to obtain your AVS? MyChart    Phone call duration: 45 minutes    Geo Fontenot MD, MD    [facility]. Phone visit  Outpatient Sleep Medicine Consultation  May 11, 2020    Name: Katrin Logan MRN# [id number]   Age: 40 year old YOB: 1980     Date of Consultation: May 11, 2020  Consultation is requested by: ROSE MARY Weaver CNP  [address] [place] [address] Brandee Banerjee  Primary care provider: No Ref-Primary, Physician       Reason for Sleep Consult:     Katrin Logan is a 40 year old female for complaints of snoring, fragmented sleep for 2 yrs          Assessment and Plan:     Katrin Logan is a 40 year old female with h/o bipolar disorder, panic attacks presents for evaluation of snoring, fragmented sleep for 2 yrs "     Summary Sleep Diagnoses:      Snoring evaluate for sleep-related breathing disorder.  Although her stop bang is 3 she has classic symptoms of sleep apnea (with differential of panic attacks)    Chronic sleep onset and sleep maintenance insomnia: It is likely multifactorial from her anxiety, conditioning and possible frequent arousals from sleep-related breathing disorder.      Comorbid Diagnoses:      Bipolar disorder    Summary Recommendations:      Discussed with her options of home sleep study versus in lab sleep study (as has significant insomnia as well).  To expedite investigation and treatment will be we will pursue with urgent HST for now, but she will need in lab PSG if HST comes out to be negative.    Had a detailed discussion with her about stimulus control and time in bed restriction.  She follows with a psychiatrist and psychologist and has been given referral for CBT-I.    EDUCATION:  - I discussed the pathophysiology of obstructive sleep apnea with the patient. We also talked about the long term sequelae of untreated moderate to severe obstructive sleep apnea such as a higher risk of hypertension, cardiovascular disease, cardiac arrhythmias, and stroke, and worse outcomes after these events, as well as poor control of hypertension and diabetes.     - I also counselled the patient about the short term effects of obstructive sleep apnea, specifically excessive daytime sleepiness which could lead to an increase in the occurrence of motor vehicle accidents. The patient was counseled to not drive if sleepy, and to pull over to the side of the road and take a nap if became sleepy while driving. The patient voiced understanding and agreement.     - I also discussed different treatment options for CHARLOTTE, including positive airway pressure, mandibular advancing oral appliances, and surgical options. ?    - Strategies to improve total sleep time, sleep environment and sleep hygiene were discussed. ?The  importance of a regular sleep-wake cycle was discussed.   Orders Placed This Encounter   Procedures     HST-Home Sleep Apnea Test     SLEEP PSYCHOLOGY REFERRAL       Summary Counseling:  See instructions    Counseling included a comprehensive review of diagnostic and therapeutic strategies as well as risks of inadequate therapy.  Educational materials provided in instructions.    virtual visit 1 week after her testing is done.         History of Present Illness:     Katrin Logan is a 40 year old female with h/o bipolar disorder, panic attacks presents for evaluation of snoring, fragmented sleep for 2 yrs      #  SYMPTOMS SUGGESTIVE OF SLEEP RELATED DISORDERED BREATHING:    Katrin Logan  has history of loud snoring,  She has  startled arousals,  has  witnessed apnea . has awakening with choking:  Katrin Logan  Has occasional  GERD/Heartburn.  She denies  nasal congestion, denies  dry mouth and denies morning headaches.   She c/o non restorative sleep and feels tired and sleepy during the day.     She denies recent weight gain.     She did not had a sleep study (PSG-Polysomnogram) in past.     # SLEEP-WAKE SCHEDULE:     Katrin Logan     -Describes themself as neither a morning or night person;  prefer to go to sleep at 10:00 PM and wake up at 7-8 AM.     -Naps 1-2 times per week for 30-60 minutes, feels refreshed after naps; takes some inadvertant naps.     Katrin Logan    -ON WEEKDAYS and weekend she tries to go to sleep at 10:00 PM during the week; awakens  8:00 AM with an alarm (intermittently based on work schedule); falls asleep in 5 minutes- 2-3/ hrs (sometimes lay in bed or may get up and watch TV, she sometimes have ruminating thoughts. It also is affected by timing of hormones). She has difficulty falling asleep.   She wakes up multiple times at night (sometimes due to not breathing, gasping like a panic attack) .     # . Katrin Logan denies  h/o sleep walking, sleep eating. She has occasional somniloquy. She  has occasional bad dreams but denies and denies dream enactment behavior. Also She has  occasioanal reports of sleep paralysis but denies hypnagogic or hypnapompic hallucinations and cataplexy. She denies h/o falling from bed, denies injuries to self or others/ bruises from movements in sleep     #. She denies  s/s suggestive of Restless legs symptoms.     # Other contributing comorbidities: Katrin Logan denies h/o  CAD / CHF/ RS: COPD/ Asthma/ Neuro-Muscular disorder               Bruxism: denies    Automatic behaviors: none    Anxiety or rumination  Yes. Suffers from bipolar disease and panic attacks. She feels they are relatively controlled at present on the current Lamictal and she very sparingly uses hydroxyzine      Does patient have a bed partner: denies  Has bed partner been sleeping separately because of snoring:  denies       SCALES       SLEEP APNEA: Stopbang score-- 3        INSOMNIA:  Insomnia severity score: 24       SLEEPINESS: Marmaduke sleepiness scale:   Marmaduke Total Score 5/8/2020   Total score - Marmaduke 10    [normal < 11]   Drowsy driving/near accidents denies         Medications:     Current Outpatient Medications   Medication Sig     ALPRAZolam (XANAX) 0.5 MG tablet Take 1 tablet (0.5 mg) by mouth 3 times daily as needed for anxiety  ---TAKES VERY RARELY     hydrOXYzine (ATARAX) 10 MG tablet TAKE ONE TABLET BY MOUTH EVERY 6 HOURS AS NEEDED FOR ANXIETY/ITCHING     lamoTRIgine (LAMICTAL) 25 MG tablet Take 2 tablets (50 mg) by mouth daily     ondansetron (ZOFRAN ODT) 4 MG ODT tab Take 1 tablet (4 mg) by mouth every 8 hours as needed for nausea     No current facility-administered medications for this visit.       Allergies   Allergen Reactions     Morphine Itching     Sertraline Anxiety     Patient does not tolerate Zoloft as it causes her to have increased anxiety and insomnia, thoughts of suicide.           Past Medical History:     denies need for supplemental 02 at night   Past Medical  "History:   Diagnosis Date     Allergic rhinitis, cause unspecified      Cervical high risk HPV (human papillomavirus) test positive 3/21/2017          Past Surgical History:    Previous upper airway surgery -denies   Past Surgical History:   Procedure Laterality Date     NO HISTORY OF SURGERY            Social History:     Social History     Tobacco Use     Smoking status: Never Smoker     Smokeless tobacco: Never Used   Substance Use Topics     Alcohol use: Yes     Comment: 2-3 drinks per week     Chemical History:     Tobacco: denies      Uses 16 ounce/day of coffee. Last caffeine intake is usually before                2 pm    Supplements for wakefulness: denies    EtOH: 2 times/ week   Recreational Drugs: denies          Family History:     Family History   Problem Relation Age of Onset     Gynecology Mother         hysterectomy     Lipids Father      Heart Disease Maternal Grandfather         pacemaker     Heart Disease Paternal Grandmother         CHF     Diabetes Paternal Grandfather      Heart Disease Paternal Grandfather         MI     Thyroid Disease Sister      Depression Sister         anxiety also dx        Sleep Family Hx:        RLS- denies  CHARLOTTE - mom, aunts and uncles  Insomnia - mom  Parasomnia - denies         Review of Systems:     A complete 10 point review of systems was negative other than HPI or as commented above          Physical Examination:     Bath Total Score 5/8/2020   Total score - Bath 10     Ht 1.753 m (5' 9\")   Wt 88.5 kg (195 lb)   BMI 28.80 kg/m    Neck Circumference: as per patient < 16 inch.           Data: All pertinent previous laboratory data reviewed     Last Comprehensive Metabolic Panel:  Sodium   Date Value Ref Range Status   01/14/2020 135 133 - 144 mmol/L Final     Potassium   Date Value Ref Range Status   01/14/2020 3.9 3.4 - 5.3 mmol/L Final     Chloride   Date Value Ref Range Status   01/14/2020 105 94 - 109 mmol/L Final     Carbon Dioxide   Date Value Ref " Range Status   01/14/2020 22 20 - 32 mmol/L Final     Anion Gap   Date Value Ref Range Status   01/14/2020 8 3 - 14 mmol/L Final     Glucose   Date Value Ref Range Status   01/14/2020 139 (H) 70 - 99 mg/dL Final     Comment:     Non Fasting     Urea Nitrogen   Date Value Ref Range Status   01/14/2020 10 7 - 30 mg/dL Final     Creatinine   Date Value Ref Range Status   01/14/2020 0.74 0.52 - 1.04 mg/dL Final     GFR Estimate   Date Value Ref Range Status   01/14/2020 >90 >60 mL/min/[1.73_m2] Final     Comment:     Non  GFR Calc  Starting 12/18/2018, serum creatinine based estimated GFR (eGFR) will be   calculated using the Chronic Kidney Disease Epidemiology Collaboration   (CKD-EPI) equation.       Calcium   Date Value Ref Range Status   01/14/2020 8.9 8.5 - 10.1 mg/dL Final     Thyroid Studies     Recent Labs   Lab Test 01/14/20  1520 02/26/19  1350 10/05/18  1014   TSH 0.84 2.36 2.08     No results found for: PH, PHARTERIAL, PO2, [id number], SAT, PCO2, HCO3, BASEEXCESS, KATHY, BEB  Lab Results   Component Value Date    TSH 0.84 01/14/2020    TSH 2.36 02/26/2019     Lab Results   Component Value Date     (H) 01/14/2020     (H) 11/14/2019     Lab Results   Component Value Date    HGB 14.9 01/14/2020    HGB 14.7 11/14/2019     Lab Results   Component Value Date    BUN 10 01/14/2020    BUN 15 11/14/2019    CR 0.74 01/14/2020    CR 1.00 11/14/2019     Lab Results   Component Value Date    AST 42 01/14/2020    AST 35 11/14/2019    ALT 88 (H) 01/14/2020    ALT 49 11/14/2019    ALKPHOS 108 01/14/2020    ALKPHOS 110 11/14/2019    BILITOTAL 0.5 01/14/2020    BILITOTAL 0.6 11/14/2019     Copy to: No Ref-Primary, Physician      Geo Fontenot MD, MD 5/11/2020     Patient discussed with Dr. Manjarrez     Sleep Staff Addendum  I have discussed, but did not see or directly speak to, the patient with the sleep fellow on May 11 and agree with the documentation.      FADIA MANJARREZ MD

## 2020-05-08 NOTE — PATIENT INSTRUCTIONS
Your BMI is Body mass index is 28.8 kg/m .  Weight management is a personal decision.  If you are interested in exploring weight loss strategies, the following discussion covers the approaches that may be successful. Body mass index (BMI) is one way to tell whether you are at a healthy weight, overweight, or obese. It measures your weight in relation to your height.  A BMI of 18.5 to 24.9 is in the healthy range. A person with a BMI of 25 to 29.9 is considered overweight, and someone with a BMI of 30 or greater is considered obese. More than two-thirds of American adults are considered overweight or obese.  Being overweight or obese increases the risk for further weight gain. Excess weight may lead to heart disease and diabetes.  Creating and following plans for healthy eating and physical activity may help you improve your health.  Weight control is part of healthy lifestyle and includes exercise, emotional health, and healthy eating habits. Careful eating habits lifelong are the mainstay of weight control. Though there are significant health benefits from weight loss, long-term weight loss with diet alone may be very difficult to achieve- studies show long-term success with dietary management in less than 10% of people. Attaining a healthy weight may be especially difficult to achieve in those with severe obesity. In some cases, medications, devices and surgical management might be considered.  What can you do?  If you are overweight or obese and are interested in methods for weight loss, you should discuss this with your provider.     Consider reducing daily calorie intake by 500 calories.     Keep a food journal.     Avoiding skipping meals, consider cutting portions instead.    Diet combined with exercise helps maintain muscle while optimizing fat loss. Strength training is particularly important for building and maintaining muscle mass. Exercise helps reduce stress, increase energy, and improves fitness.  "Increasing exercise without diet control, however, may not burn enough calories to loose weight.       Start walking three days a week 10-20 minutes at a time    Work towards walking thirty minutes five days a week     Eventually, increase the speed of your walking for 1-2 minutes at time    In addition, we recommend that you review healthy lifestyles and methods for weight loss available through the National Institutes of Health patient information sites:  http://win.niddk.nih.gov/publications/index.htm    And look into health and wellness programs that may be available through your health insurance provider, employer, local community center, or monet club.    Weight management plan: Patient was referred to their PCP to discuss a diet and exercise plan.       MY TREATMENT INFORMATION FOR SLEEP APNEA-  Katrin Logan    DOCTOR : Geo Fontenot MD, MD  SLEEP CENTER :      MY CONTACT NUMBER:     Am I having a sleep study at a sleep center?  Make sure you have an appointment for the study before you leave!    Am I having a home sleep study?  Watch this video:  /drop off device-   Https://www.Magento.com/watch?v=yGGFBdELGhk  Disposable device sent out require phone/computer application-   https://www.Magento.com/watch?v=BCce_vbiwxE  Please verify your insurance coverage with your insurance carrier    Frequently asked questions:  1. What is Obstructive Sleep Apnea (CHARLOTTE)? CHARLOTTE is the most common type of sleep apnea. Apnea means, \"without breath.\"  Apnea is most often caused by narrowing or collapse of the upper airway as muscles relax during sleep.   Almost everyone has occasional apneas. Most people with sleep apnea have had brief interruptions at night frequently for many years.  The severity of sleep apnea is related to how frequent and severe the events are.   2. What are the consequences of CHARLOTTE? Symptoms include: feeling sleepy during the day, snoring loudly, gasping or stopping of breathing, trouble sleeping, " and occasionally morning headaches or heartburn at night.  Sleepiness can be serious and even increase the risk of falling asleep while driving. Other health consequences may include development of high blood pressure and other cardiovascular disease in persons who are susceptible. Untreated CHARLOTTE  can contribute to heart disease, stroke and diabetes.   3. What are the treatment options? In most situations, sleep apnea is a lifelong disease that must be managed with daily therapy. Medications are not effective for sleep apnea and surgery is generally not considered until other therapies have been tried. Your treatment is your choice . Continuous Positive Airway (CPAP) works right away and is the therapy that is effective in nearly everyone. An oral device to hold your jaw forward is usually the next most reliable option. Other options include postioning devices (to keep you off your back), weight loss, and surgery including a tongue pacing device. There is more detail about some of these options below.    Important tips for using CPAP and similar devices   Know your equipment:  CPAP is continuous positive airway pressure that prevents obstructive sleep apnea by keeping the throat from collapsing while you are sleeping. In most cases, the device is  smart  and can slowly self-adjusts if your throat collapses and keeps a record every day of how well you are treated-this information is available to you and your care team.  BPAP is bilevel positive airway pressure that keeps your throat open and also assists each breath with a pressure boost to maintain adequate breathing.  Special kinds of BPAP are used in patients who have inadequate breathing from lung or heart disease. In most cases, the device is  smart  and can slowly self-adjusts to assist breathing. Like CPAP, the device keeps a record of how well you are treated.  Your mask is your connection to the device. You get to choose what feels most comfortable and the  staff will help to make sure if fits. Here: are some examples of the different masks that are available:       Key points to remember on your journey with sleep apnea:  1. Sleep study.  PAP devices often need to be adjusted during a sleep study to show that they are effective and adjusted right.  2. Good tips to remember: Try wearing just the mask during a quiet time during the day so your body adapts to wearing it. A humidifier is recommended for comfort in most cases to prevent drying of your nose and throat. Allergy medication from your provider may help you if you are having nasal congestion.  3. Getting settled-in. It takes more than one night for most of us to get used to wearing a mask. Try wearing just the mask during a quiet time during the day so your body adapts to wearing it. A humidifier is recommended for comfort in most cases. Our team will work with you carefully on the first day and will be in contact within 4 days and again at 2 and 4 weeks for advice and remote device adjustments. Your therapy is evaluated by the device each day.   4. Use it every night. The more you are able to sleep naturally for 7-8 hours, the more likely you will have good sleep and to prevent health risks or symptoms from sleep apnea. Even if you use it 4 hours it helps. Occasionally all of us are unable to use a medical therapy, in sleep apnea, it is not dangerous to miss one night.   5. Communicate. Call our skilled team on the number provided on the first day if your visit for problems that make it difficult to wear the device. Over 2 out of 3 patients can learn to wear the device long-term with help from our team. Remember to call our team or your sleep providers if you are unable to wear the device as we may have other solutions for those who cannot adapt to mask CPAP therapy. It is recommended that you sleep your sleep provider within the first 3 months and yearly after that if you are not having problems.   6. Use it  for your health. We encourage use of CPAP masks during daytime quiet periods to allow your face and brain to adapt to the sensation of CPAP so that it will be a more natural sensation to awaken to at night or during naps. This can be very useful during the first few weeks or months of adapting to CPAP though it does not help medically to wear CPAP during wakefulness and  should not be used as a strategy just to meet guidelines.  7. Take care of your equipment. Make sure you clean your mask and tubing using directions every day and that your filter and mask are replaced as recommended or if they are not working.     BESIDES CPAP, WHAT OTHER THERAPIES ARE THERE?    Positioning Device  Positioning devices are generally used when sleep apnea is mild and only occurs on your back.This example shows a pillow that straps around the waist. It may be appropriate for those whose sleep study shows milder sleep apnea that occurs primarily when lying flat on one's back. Preliminary studies have shown benefit but effectiveness at home may need to be verified by a home sleep test. These devices are generally not covered by medical insurance.  Examples of devices that maintain sleeping on the back to prevent snoring and mild sleep apnea.    Belt type body positioner  Http://[employer]bachi/    Electronic reminder  Http://nightshifttherapy.com/  Http://[email or web address]au/      Oral Appliance  What is oral appliance therapy?  An oral appliance device fits on your teeth at night like a retainer used after having braces. The device is made by a specialized dentist and requires several visits over 1-2 months before a manufactured device is made to fit your teeth and is adjusted to prevent your sleep apnea. Once an oral device is working properly, snoring should be improved. A home sleep test may be recommended at that time if to determine whether the sleep apnea is adequately treated.       Some things to remember:  -Oral devices are often,  but not always, covered by your medical insurance. Be sure to check with your insurance provider.   -If you are referred for oral therapy, you will be given a list of specialized dentists to consider or you may choose to visit the Web site of the American Academy of Dental Sleep Medicine  -Oral devices are less likely to work if you have severe sleep apnea or are extremely overweight.     More detailed information  An oral appliance is a small acrylic device that fits over the upper and lower teeth  (similar to a retainer or a mouth guard). This device slightly moves jaw forward, which moves the base of the tongue forward, opens the airway, improves breathing for effective treat snoring and obstructive sleep apnea in perhaps 7 out of 10 people .  The best working devices are custom-made by a dental device  after a mold is made of the teeth 1, 2, 3.  When is an oral appliance indicated?  Oral appliance therapy is recommended as a first-line treatment for patients with primary snoring, mild sleep apnea, and for patients with moderate sleep apnea who prefer appliance therapy to use of CPAP4, 5. Severity of sleep apnea is determined by sleep testing and is based on the number of respiratory events per hour of sleep.   How successful is oral appliance therapy?  The success rate of oral appliance therapy in patients with mild sleep apnea is 75-80% while in patients with moderate sleep apnea it is 50-70%. The chance of success in patients with severe sleep apnea is 40-50%. The research also shows that oral appliances have a beneficial effect on the cardiovascular health of CHARLOTTE patients at the same magnitude as CPAP therapy7.  Oral appliances should be a second-line treatment in cases of severe sleep apnea, but if not completely successful then a combination therapy utilizing CPAP plus oral appliance therapy may be effective. Oral appliances tend to be effective in a broad range of patients although studies show  that the patients who have the highest success are females, younger patients, those with milder disease, and less severe obesity. 3, 6.   Finding a dentist that practices dental sleep medicine  Specific training is available through the American Academy of Dental Sleep Medicine for dentists interested in working in the field of sleep. To find a dentist who is educated in the field of sleep and the use of oral appliances, near you, visit the Web site of the American Academy of Dental Sleep Medicine.    References  1. Bruce, et al. Objectively measured vs self-reported compliance during oral appliance therapy for sleep-disordered breathing. Chest 2013; 144(5): 8884-2043.  2. Mariela, et al. Objective measurement of compliance during oral appliance therapy for sleep-disordered breathing. Thorax 2013; 68(1): 91-96.  3. Kaci et al. Mandibular advancement devices in 620 men and women with CHARLTOTE and snoring: tolerability and predictors of treatment success. Chest 2004; 125: 5834-4422.  4. Juanita, et al. Oral appliances for snoring and CHARLOTTE: a review. Sleep 2006; 29: 244-262.  5. Toya et al. Oral appliance treatment for CHARLOTTE: an update. J Clin Sleep Med 2014; 10(2): 215-227.  6. Irene et al. Predictors of OSAH treatment outcome. J Dent Res 2007; 86: 3237-8375.      Weight Loss:    Weight loss is a long-term strategy that may improve sleep apnea in some patients.    Weight management is a personal decision and the decision should be based on your interest and the potential benefits.  If you are interested in exploring weight loss strategies, the following discussion covers the impact on weight loss on sleep apnea and the approaches that may be successful.    Being overweight does not necessarily mean you will have health consequences.  Those who have BMI over 35 or over 27 with existing medical conditions carries greater risk.   Weight loss decreases severity of sleep apnea in most people with obesity.  For those with mild obesity who have developed snoring with weight gain, even 15-30 pound weight loss can improve and occasionally eliminate sleep apnea.  Structured and life-long dietary and health habits are necessary to lose weight and keep healthier weight levels.     Though there may be significant health benefits from weight loss, long-term weight loss is very difficult to achieve- studies show success with dietary management in less than 10% of people. In addition, substantial weight loss may require years of dietary control and may be difficult if patients have severe obesity. In these cases, surgical management may be considered.  Finally, older individuals who have tolerated obesity without health complications may be less likely to benefit from weight loss strategies.        Your BMI is Body mass index is 28.8 kg/m .  Weight management is a personal decision.  If you are interested in exploring weight loss strategies, the following discussion covers the approaches that may be successful. Body mass index (BMI) is one way to tell whether you are at a healthy weight, overweight, or obese. It measures your weight in relation to your height.  A BMI of 18.5 to 24.9 is in the healthy range. A person with a BMI of 25 to 29.9 is considered overweight, and someone with a BMI of 30 or greater is considered obese. More than two-thirds of American adults are considered overweight or obese.  Being overweight or obese increases the risk for further weight gain. Excess weight may lead to heart disease and diabetes.  Creating and following plans for healthy eating and physical activity may help you improve your health.  Weight control is part of healthy lifestyle and includes exercise, emotional health, and healthy eating habits. Careful eating habits lifelong are the mainstay of weight control. Though there are significant health benefits from weight loss, long-term weight loss with diet alone may be very difficult to  achieve- studies show long-term success with dietary management in less than 10% of people. Attaining a healthy weight may be especially difficult to achieve in those with severe obesity. In some cases, medications, devices and surgical management might be considered.  What can you do?  If you are overweight or obese and are interested in methods for weight loss, you should discuss this with your provider.     Consider reducing daily calorie intake by 500 calories.     Keep a food journal.     Avoiding skipping meals, consider cutting portions instead.    Diet combined with exercise helps maintain muscle while optimizing fat loss. Strength training is particularly important for building and maintaining muscle mass. Exercise helps reduce stress, increase energy, and improves fitness. Increasing exercise without diet control, however, may not burn enough calories to loose weight.       Start walking three days a week 10-20 minutes at a time    Work towards walking thirty minutes five days a week     Eventually, increase the speed of your walking for 1-2 minutes at time    In addition, we recommend that you review healthy lifestyles and methods for weight loss available through the National Institutes of Health patient information sites:  http://win.niddk.nih.gov/publications/index.htm    And look into health and wellness programs that may be available through your health insurance provider, employer, local community center, or monet club.          Surgery:    Surgery for obstructive sleep apnea is considered generally only when other therapies fail to work. Surgery may be discussed with you if you are having a difficult time tolerating CPAP and or when there is an abnormal structure that requires surgical correction.  Nose and throat surgeries often enlarge the airway to prevent collapse.  Most of these surgeries create pain for 1-2 weeks and up to half of the most common surgeries are not effective throughout life.   You should carefully discuss the benefits and drawbacks to surgery with your sleep provider and surgeon to determine if it is the best solution for you.   More information  Surgery for CHARLOTTE is directed at areas that are responsible for narrowing or complete obstruction of the airway during sleep.  There are a wide range of procedures available to enlarge and/or stabilize the airway to prevent blockage of breathing in the three major areas where it can occur: the palate, tongue, and nasal regions.  Successful surgical treatment depends on the accurate identification of the factors responsible for obstructive sleep apnea in each person.  A personalized approach is required because there is no single treatment that works well for everyone.  Because of anatomic variation, consultation with an examination by a sleep surgeon is a critical first step in determining what surgical options are best for each patient.  In some cases, examination during sedation may be recommended in order to guide the selection of procedures.  Patients will be counseled about risks and benefits as well as the typical recovery course after surgery. Surgery is typically not a cure for a person s CHARLOTTE.  However, surgery will often significantly improve one s CHARLOTTE severity (termed  success rate ).  Even in the absence of a cure, surgery will decrease the cardiovascular risk associated with OSA7; improve overall quality of life8 (sleepiness, functionality, sleep quality, etc).      Palate Procedures:  Patients with CHARLOTTE often have narrowing of their airway in the region of their tonsils and uvula.  The goals of palate procedures are to widen the airway in this region as well as to help the tissues resist collapse.  Modern palate procedure techniques focus on tissue conservation and soft tissue rearrangement, rather than tissue removal.  Often the uvula is preserved in this procedure. Residual sleep apnea is common in patient after pharyngoplasty with an  average reduction in sleep apnea events of 33%2.      Tongue Procedures:  ExamWhile patients are awake, the muscles that surround the throat are active and keep this region open for breathing. These muscles relax during sleep, allowing the tongue and other structures to collapse and block breathing.  There are several different tongue procedures available.  Selection of a tongue base procedure depends on characteristics seen on physical exam.  Generally, procedures are aimed at removing bulky tissues in this area or preventing the back of the tongue from falling back during sleep.  Success rates for tongue surgery range from 50-62%3.    Hypoglossal Nerve Stimulation:  Hypoglossal nerve stimulation has recently received approval from the [place] Food and Drug Administration for the treatment of obstructive sleep apnea.  This is based on research showing that the system was safe and effective in treating sleep apnea6.  Results showed that the median AHI score decreased 68%, from 29.3 to 9.0. This therapy uses an implant system that senses breathing patterns and delivers mild stimulation to airway muscles, which keeps the airway open during sleep.  The system consists of three fully implanted components: a small generator (similar in size to a pacemaker), a breathing sensor, and a stimulation lead.  Using a small handheld remote, a patient turns the therapy on before bed and off upon awakening.    Candidates for this device must be greater than 22 years of age, have moderate to severe CHARLOTTE (AHI between 20-65), BMI less than 32, have tried CPAP/oral appliance without success, and have appropriate upper airway anatomy (determined by a sleep endoscopy performed by Dr. Kern).    Hypoglossal Nerve Stimulation Pathway:    The sleep surgeon s office will work with the patient through the insurance prior-authorization process (including communications and appeals).    Nasal Procedures:  Nasal obstruction can interfere  with nasal breathing during the day and night.  Studies have shown that relief of nasal obstruction can improve the ability of some patients to tolerate positive airway pressure therapy for obstructive sleep apnea1.  Treatment options include medications such as nasal saline, topical corticosteroid and antihistamine sprays, and oral medications such as antihistamines or decongestants. Non-surgical treatments can include external nasal dilators for selected patients. If these are not successful by themselves, surgery can improve the nasal airway either alone or in combination with these other options.      Combination Procedures:  Combination of surgical procedures and other treatments may be recommended, particularly if patients have more than one area of narrowing or persistent positional disease.  The success rate of combination surgery ranges from 66-80%2,3.    References  1. Tj MEDRANO. The Role of the Nose in Snoring and Obstructive Sleep Apnoea: An Update.  Eur Arch Otorhinolaryngol. 2011; 268: 1365-73.  2.  Rashaun SM; Vic JA; Leodan JR; Pallanch JF; Shakila MB; Jessica SG; Archana GALINDO. Surgical modifications of the upper airway for obstructive sleep apnea in adults: a systematic review and meta-analysis. SLEEP 2010;33(10):1453-0246. Nina IVERSON. Hypopharyngeal surgery in obstructive sleep apnea: an evidence-based medicine review.  Arch Otolaryngol Head Neck Surg. 2006 Feb;132(2):206-13.  3. Pavel HODGESH1, Leon Y, Roland ANGELICA. The efficacy of anatomically based multilevel surgery for obstructive sleep apnea. Otolaryngol Head Neck Surg. 2003 Oct;129(4):327-35.  4. Nina IVERSON, Goldberg A. Hypopharyngeal Surgery in Obstructive Sleep Apnea: An Evidence-Based Medicine Review. Arch Otolaryngol Head Neck Surg. 2006 Feb;132(2):206-13.  5. Farida ALEXANDRA et al. Upper-Airway Stimulation for Obstructive Sleep Apnea.  N Engl J Med. 2014 Jan 9;370(2):139-49.  6. Justina Y et al. Increased Incidence of Cardiovascular Disease in  Middle-aged Men with Obstructive Sleep Apnea. Am J Respir Crit Care Med; 2002 166: 159-165  7. Salome EVANS et al. Studying Life Effects and Effectiveness of Palatopharyngoplasty (SLEEP) study: Subjective Outcomes of Isolated Uvulopalatopharyngoplasty. Otolaryngol Head Neck Surg. 2011; 144: 623-631.    Each of us has a built in tendency to find it easier to stay up late at night or get up early in the morning.  Being a  night owl  or  early bird  is a function of our internal body clock.  When you are forced to keep a sleep schedule that goes against your typical habits (because a job or other responsibilities) insomnia can be the result.  Try the following changes to see if you can improve your sleep patterns:    Pick a sleep and wake schedule with about 7-8 hours in bed that is consistent.  That means keep the same bedtime and rise time every day of the week including the weekends, for the next 4-6 weeks    Try to get outside for at least 30 minutes but preferably up to 2 hours to get some bright sunlight.  Bright light is the best way to  set  your internal body clock and bright light exposure before bedtime may help delay your tendency to fall asleep too early.    Keep as busy as possible in the evening hours and avoid falling asleep and avoid sedentary activities as much as possible.      Please keep a sleep log or diary during this time to track your sleep patterns        [place]  You have symptoms of insomnia and would likely benefit from non-medication approaches to treatment.  Cognitive behavioral treatment (CBT) for insomnia is a very effective technique that involves changing your behaviors and thoughts associated with sleep.  People that are motivated to make changes in their sleep pattern are likely to benefit from internet based cognitive behavioral treatment for insomnia.  Internet CBT programs include but are not limited to www.SHUTi.me or www.SleepIO.com.  There is a fee for using these programs that  is similar to actually seeing an insomnia expert.  By entering the code  [place]  it will allow us to know if our patients find these services useful.      Online Programs     www.SHUTi.me (pronounced shut eye). There is a fee for this program. Enter the code  [place]  if you decide to enroll in this program.      www.sleepIO.com (pronounced sleep ee oh). There is a fee for this program. Enter the code  [place]  if you decide to enroll in this program.

## 2020-05-11 ENCOUNTER — VIRTUAL VISIT (OUTPATIENT)
Dept: SLEEP MEDICINE | Facility: CLINIC | Age: 40
End: 2020-05-11
Payer: MEDICAID

## 2020-05-11 DIAGNOSIS — G47.30 SLEEP APNEA, UNSPECIFIED TYPE: ICD-10-CM

## 2020-05-11 DIAGNOSIS — G47.9 SLEEP DISTURBANCE: Primary | ICD-10-CM

## 2020-06-04 ENCOUNTER — OFFICE VISIT (OUTPATIENT)
Dept: SLEEP MEDICINE | Facility: CLINIC | Age: 40
End: 2020-06-04
Payer: MEDICAID

## 2020-06-04 DIAGNOSIS — G47.9 SLEEP DISTURBANCE: ICD-10-CM

## 2020-06-04 DIAGNOSIS — R06.83 SNORING: Primary | ICD-10-CM

## 2020-06-04 PROCEDURE — G0399 HOME SLEEP TEST/TYPE 3 PORTA: HCPCS | Mod: GC | Performed by: INTERNAL MEDICINE

## 2020-06-05 ENCOUNTER — DOCUMENTATION ONLY (OUTPATIENT)
Dept: SLEEP MEDICINE | Facility: CLINIC | Age: 40
End: 2020-06-05
Attending: INTERNAL MEDICINE
Payer: MEDICAID

## 2020-06-05 NOTE — PROGRESS NOTES
HST POST-STUDY QUESTIONNAIRE    1. What time did you go to bed?  Midnight  2. How long do you think it took to fall asleep?  20 minutes  3. What time did you wake up to start the day?  7:00 am  4. Did you get up during the night at all?  No  5. If you woke up, do you remember approximately what time(s)? No  6. Did you have any difficulty with the equipment?  No  7. Did you us any type of treatment with this study?  None  8. Was the head of the bed elevated? No  9. Did you sleep in a recliner?  No  10. Did you stop using CPAP at least 3 days before this test?  NA  11. Any other information you'd like us to know? none

## 2020-06-05 NOTE — PROGRESS NOTES
HST POST-STUDY QUESTIONNAIRE    1. What time did you go to bed?  11:20 pm  2. How long do you think it took to fall asleep?  10 min?  3. What time did you wake up to start the day?  6:00 am  4. Did you get up during the night at all?  Yes  5. If you woke up, do you remember approximately what time(s)? 3 times?  6. Did you have any difficulty with the equipment?  No  7. Did you us any type of treatment with this study?  None  8. Was the head of the bed elevated? No  9. Did you sleep in a recliner?  No  10. Did you stop using CPAP at least 3 days before this test?  NA  11. Any other information you'd like us to know? Equipment was easy to use. Thanks!

## 2020-06-05 NOTE — PROGRESS NOTES
This HSAT was performed using a Noxturnal T3 device which recorded snore, sound, movement activity, body position, nasal pressure, oronasal thermal airflow, pulse, oximetry and both chest and abdominal respiratory effort. HSAT data was restricted to the time patient states they were in bed.     HSAT was scored using 1B 4% hypopnea rule.     AHI: 10.7.Snoring was reported as loud.  Time with SpO2 below 89% was 8.5 minutes.   Overall signal quality was good     Pt will follow up with sleep provider to determine appropriate therapy.     Ordering Provider, Geo Fontenot MD Charles O., BA, RPSGT, RST System Clinical Specialist 06/5/2020

## 2020-06-09 NOTE — PROCEDURES
"HOME SLEEP STUDY INTERPRETATION    Patient: Katrin Logan  MRN: [id number]  YOB: 1980  Study Date: 6/4/2020  Referring Provider: No Ref-Primary, Physician;  Ordering Provider: Dr. ALISHA Fontenot/ Dr. Blandon      Indications for Home Study: Katrin Logan is a 40 year old female with h/o bipolar disorder, panic attacks presents with symptoms suggestive of obstructive sleep apnea and chronic sleep onset and sleep maintenance insomnia:    Estimated body mass index is 28.8 kg/m  as calculated from the following:    Height as of 5/8/20: 1.753 m (5' 9\").    Weight as of 5/8/20: 88.5 kg (195 lb).  Total score - [place]: 10 (5/8/2020  1:06 PM)  StopBang Total Score: 3 (5/11/2020  1:00 PM)Total Score: 3 (5/11/2020  1:00 PM)    Data: A full night home sleep study was performed recording the standard physiologic parameters including body position, movement, sound, nasal pressure, thermal oral airflow, chest and abdominal movements with respiratory inductance plethysmography, and oxygen saturation by pulse oximetry. Pulse rate was estimated by oximetry recording. This study was considered adequate based on > 4 hours of quality oximetry and respiratory recording. As specified by the AASM Manual for the Scoring of Sleep and Associated events, version 2.3, Rule VIII.D 1B, 4% oxygen desaturation scoring for hypopneas is used as a standard of care on all home sleep apnea testing.    Analysis Time:  385.3 minutes    Respiration:   Sleep Associated Hypoxemia: sustained hypoxemia was present. Baseline oxygen saturation was 93.7%.  Time with saturation less than or equal to 88% was 8.5 minutes. The lowest oxygen saturation was 75%.   Snoring: Snoring was present.  Respiratory events: The home study revealed a presence of 3 obstructive apneas and 0 mixed and central apneas. There were 66 hypopneas resulting in a combined apnea/hypopnea index [AHI] of 10.7 events per hour.  AHI was 16.2 per hour supine, n/a per hour prone, 11.4 per " hour on left side, and 2.9 per hour on right side.   Pattern: Excluding events noted above, respiratory rate and pattern was Normal.    Position: Percent of time spent: supine - 33.4%, prone - 0%, on left - 39.6%, on right - 27%.    Heart Rate: By pulse oximetry normal rate was noted with intermittent tachycardia. Avrage pulse rate 3. Highest pulse rate 103.     Assessment:   Mild obstructive sleep apnea.  Sleep associated hypoxemia was present.    Recommendations:  Consider auto-CPAP at 5-15 cmH2O or oral appliance therapy. Consider overnight oximetry on treatment to ensure resolution of hypoxemia   Suggest optimizing sleep hygiene and avoiding sleep deprivation.  Weight management.    Diagnosis Code(s): Obstructive Sleep Apnea G47.33, Hypoxemia G47.36, Snoring R06.83    Geo Fontenot MD, MD, June 9, 2020   Diplomate, American Board of Internal Medicine  Sleep medicine fellow    To be staffed by Dr. Blandon

## 2020-06-10 VITALS — HEIGHT: 69 IN | BODY MASS INDEX: 28.88 KG/M2 | WEIGHT: 195 LBS

## 2020-06-10 ASSESSMENT — MIFFLIN-ST. JEOR: SCORE: 1613.89

## 2020-06-10 NOTE — PROGRESS NOTES
"Katrin Logan is a 40 year old female who is being evaluated via a billable telephone visit.      The patient has been notified of following:     \"This telephone visit will be conducted via a call between you and your physician/provider. We have found that certain health care needs can be provided without the need for a physical exam.  This service lets us provide the care you need with a short phone conversation.  If a prescription is necessary we can send it directly to your pharmacy.  If lab work is needed we can place an order for that and you can then stop by our lab to have the test done at a later time.    Telephone visits are billed at different rates depending on your insurance coverage. During this emergency period, for some insurers they may be billed the same as an in-person visit.  Please reach out to your insurance provider with any questions.    If during the course of the call the physician/provider feels a telephone visit is not appropriate, you will not be charged for this service.\"    Patient has given verbal consent for Telephone visit?  Yes    What phone number would you like to be contacted at? [phone]    How would you like to obtain your AVS? [facility]   Outpatient Sleep Medicine  June 11, 2020      Name: Katrin Logan MRN# [id number]   Age: 40 year old YOB: 1980        Chief Complaint      Chief Complaint   Patient presents with     Study Results            History of Present Illness:     Katrin Logan is a 40 year old female with a history of bipolar disorder and panic attacks who presents to the clinic today via telephone to discuss recent home sleep apnea results.  Patient believes that this was a good representation of her sleep.  States \"I thought I would have problems with the new newness of having things on my body but I didn't\".     We reviewed the following results from her home sleep study completed 6/4/2020:   Estimated body mass " "index is 28.8 kg/m  as calculated from the following:    Height as of 5/8/20: 1.753 m (5' 9\").    Weight as of 5/8/20: 88.5 kg (195 lb).  Total score - [place]: 10 (5/8/2020  1:06 PM)  StopBang Total Score: 3 (5/11/2020  1:00 PM)     Data: A full night home sleep study was performed recording the standard physiologic parameters including body position, movement, sound, nasal pressure, thermal oral airflow, chest and abdominal movements with respiratory inductance plethysmography, and oxygen saturation by pulse oximetry. Pulse rate was estimated by oximetry recording. This study was considered adequate based on > 4 hours of quality oximetry and respiratory recording. As specified by the AASM Manual for the Scoring of Sleep and Associated events, version 2.3, Rule VIII.D 1B, 4% oxygen desaturation scoring for hypopneas is used as a standard of care on all home sleep apnea testing.     Analysis Time:  385.3 minutes     Respiration:   Sleep Associated Hypoxemia: sustained hypoxemia was present. Baseline oxygen saturation was 93.7%.  Time with saturation less than or equal to 88% was 8.5 minutes. The lowest oxygen saturation was 75%.   Snoring: Snoring was present.  Respiratory events: The home study revealed a presence of 3 obstructive apneas and 0 mixed and central apneas. There were 66 hypopneas resulting in a combined apnea/hypopnea index [AHI] of 10.7 events per hour.  AHI was 16.2 per hour supine, n/a per hour prone, 11.4 per hour on left side, and 2.9 per hour on right side.   Pattern: Excluding events noted above, respiratory rate and pattern was Normal.     Position: Percent of time spent: supine - 33.4%, prone - 0%, on left - 39.6%, on right - 27%.    Heart Rate: By pulse oximetry normal rate was noted with intermittent tachycardia.     Past medical/surgical history, family history, social history, medications and allergies were reviewed.           Physical Examination:   Ht 1.753 m (5' 9\")   Wt 88.5 kg (195 " lb)   BMI 28.80 kg/m    General: Very pleasant and cooperative. In no apparent distress.  Pulmonary: Able to speak easily in full sentences.  No cough or wheeze.  Neurologic: Alert, oriented x3.   Psychiatric: Mood euthymic. Affect congruent with full range and intensity.         Assessment and Plan:   1. [place] (obstructive sleep apnea)  2. Sleep related hypoxia  During this visit, we reviewed the summary of the study.  Patient was diagnosed with mild obstructive sleep apnea, as indicated by an apnea hypopnea index of 10.7 events per hour.  Sustained sleep associated hypoxemia was present, though mild, with 8.5 minutes <88% SpO2.    We discussed treatment recommendations, including autoCPAP versus oral appliance therapy and in addition recommend weight management, optimizing sleep hygiene, and avoiding sleep deprivation.  After discussion of options, Katrin would like to pursue treatment with mandibular advancement device.  Referral to sleep dentistry given today.  Per recommendation by Dr. Geo Fontenot, will plan to obtain overnight oximetry once on treatment to ensure resolution of hypoxemia.   - SLEEP DENTAL REFERRAL    If oral appliance is not tolerated or is not an adequate treatment, patient is willing to start CPAP therapy.    Katrin Doreen will follow up in 2- 3 months, or after oral appliance is in place. Will obtain EDDIE prior to follow-up appointment and will discuss results at that time.   - Overnight oximetry study; Future        CC:  No Ref-Primary, Physician    Keren Craig PA-C  Jun 11, 2020     [facility]  3rd Floor  [address] [place] [address]     [facility]  [address] [address]      Phone call duration: 18:19 minutes

## 2020-06-11 ENCOUNTER — VIRTUAL VISIT (OUTPATIENT)
Dept: SLEEP MEDICINE | Facility: CLINIC | Age: 40
End: 2020-06-11
Payer: COMMERCIAL

## 2020-06-11 DIAGNOSIS — G47.33 OSA (OBSTRUCTIVE SLEEP APNEA): Primary | ICD-10-CM

## 2020-06-11 DIAGNOSIS — G47.34 SLEEP RELATED HYPOXIA: ICD-10-CM

## 2020-06-11 PROCEDURE — 99214 OFFICE O/P EST MOD 30 MIN: CPT | Mod: 95 | Performed by: PHYSICIAN ASSISTANT

## 2020-06-18 DIAGNOSIS — F31.81 BIPOLAR 2 DISORDER (H): ICD-10-CM

## 2020-06-22 ENCOUNTER — MYC MEDICAL ADVICE (OUTPATIENT)
Dept: INTERNAL MEDICINE | Facility: CLINIC | Age: 40
End: 2020-06-22

## 2020-06-22 RX ORDER — LAMOTRIGINE 25 MG/1
TABLET ORAL
Qty: 180 TABLET | Refills: 1 | Status: SHIPPED | OUTPATIENT
Start: 2020-06-22 | End: 2021-01-07

## 2020-06-22 NOTE — TELEPHONE ENCOUNTER
"Per 1/14/20 encounter:  \"2. Bipolar 2 disorder (H)  Needs to see psych provider   Will fill until she gets in to psych - currently her insurance does not allow this    - lamoTRIgine (LAMICTAL) 25 MG tablet; Take 2 tablets (50 mg) by mouth daily  Dispense: 60 tablet; Refill: 3\"    Globialt message sent asking patient if she is seeing someone from psych yet   "

## 2020-06-22 NOTE — TELEPHONE ENCOUNTER
"Per 6/22/20 abad encounter:  \"I am working on that. Our insurance didn't cover mental health. I've just gotten set up with a different insurance company but I still need to find a provider. I need this refill to go through until I can find someone.   Thanks!  Katrin\"    Can refills be given to get patient through until then?  "

## 2020-06-22 NOTE — TELEPHONE ENCOUNTER
"Requested Prescriptions   Pending Prescriptions Disp Refills     lamoTRIgine (LAMICTAL) 25 MG tablet [Pharmacy Med Name: LAMOTRIGINE 25 MG TABLET] 180 tablet 1     Sig: TAKE 2 TABLETS BY MOUTH EVERY DAY       Anti-Seizure Meds Protocol  Failed - 6/18/2020 12:16 AM        Failed - Review Authorizing provider's last note.      Refer to last progress notes: confirm request is for original authorizing provider (cannot be through other providers).          Failed - Normal CBC on file in past 26 months     Recent Labs   Lab Test 01/14/20  1520   WBC 7.9   RBC 5.39*   HGB 14.9   HCT 46.6                    Failed - Normal ALT or AST on file in past 26 months     Recent Labs   Lab Test 01/14/20  1520   ALT 88*     Recent Labs   Lab Test 01/14/20  1520   AST 42             Passed - Recent (12 mo) or future (30 days) visit within the authorizing provider's specialty     Patient has had an office visit with the authorizing provider or a provider within the authorizing providers department within the previous 12 mos or has a future within next 30 days. See \"Patient Info\" tab in inbasket, or \"Choose Columns\" in Meds & Orders section of the refill encounter.              Passed - Normal platelet count on file in past 26 months     Recent Labs   Lab Test 01/14/20  1520                  Passed - Medication is active on med list        Passed - No active pregnancy on record        Passed - No positive pregnancy test in last 12 months             "

## 2020-06-24 NOTE — PROGRESS NOTES
"HOME SLEEP STUDY INTERPRETATION     Patient: Katrin Loagn  MRN: [id number]  YOB: 1980  Study Date: 6/4/2020  Referring Provider: No Ref-Primary, Physician;  Ordering Provider: Dr. ALISHA Fontenot/ Dr. Blandon      Indications for Home Study: Katrin Logan is a 40 year old female with h/o bipolar disorder, panic attacks presents with symptoms suggestive of obstructive sleep apnea and chronic sleep onset and sleep maintenance insomnia:     Estimated body mass index is 28.8 kg/m  as calculated from the following:    Height as of 5/8/20: 1.753 m (5' 9\").    Weight as of 5/8/20: 88.5 kg (195 lb).  Total score - [place]: 10 (5/8/2020  1:06 PM)  StopBang Total Score: 3 (5/11/2020  1:00 PM)Total Score: 3 (5/11/2020  1:00 PM)     Data: A full night home sleep study was performed recording the standard physiologic parameters including body position, movement, sound, nasal pressure, thermal oral airflow, chest and abdominal movements with respiratory inductance plethysmography, and oxygen saturation by pulse oximetry. Pulse rate was estimated by oximetry recording. This study was considered adequate based on > 4 hours of quality oximetry and respiratory recording. As specified by the AASM Manual for the Scoring of Sleep and Associated events, version 2.3, Rule VIII.D 1B, 4% oxygen desaturation scoring for hypopneas is used as a standard of care on all home sleep apnea testing.     Analysis Time:  385.3 minutes     Respiration:   Sleep Associated Hypoxemia: sustained hypoxemia was present. Baseline oxygen saturation was 93.7%.  Time with saturation less than or equal to 88% was 8.5 minutes. The lowest oxygen saturation was 75%.   Snoring: Snoring was present.  Respiratory events: The home study revealed a presence of 3 obstructive apneas and 0 mixed and central apneas. There were 66 hypopneas resulting in a combined apnea/hypopnea index [AHI] of 10.7 events per hour.  AHI was 16.2 per hour supine, n/a per hour prone, 11.4 " per hour on left side, and 2.9 per hour on right side.   Pattern: Excluding events noted above, respiratory rate and pattern was Normal.     Position: Percent of time spent: supine - 33.4%, prone - 0%, on left - 39.6%, on right - 27%.     Heart Rate: By pulse oximetry normal rate was noted with intermittent tachycardia. Avrage pulse rate 3. Highest pulse rate 103.      Assessment:   Mild obstructive sleep apnea.  Sleep associated hypoxemia was present.     Recommendations:  Consider auto-CPAP at 5-15 cmH2O or oral appliance therapy. Consider overnight oximetry on treatment to ensure resolution of hypoxemia   Suggest optimizing sleep hygiene and avoiding sleep deprivation.  Weight management.     Diagnosis Code(s): Obstructive Sleep Apnea G47.33, Hypoxemia G47.36, Snoring R06.83     Geo Fontenot MD, MD, June 9, 2020   Diplomate, American Board of Internal Medicine  Sleep medicine fellow     Neuro Staff Addendum    I have reviewed with Dr Fontenot and agree with the Assessment and recommendations.

## 2020-11-29 ENCOUNTER — HEALTH MAINTENANCE LETTER (OUTPATIENT)
Age: 40
End: 2020-11-29

## 2021-01-06 DIAGNOSIS — F31.81 BIPOLAR 2 DISORDER (H): ICD-10-CM

## 2021-01-07 RX ORDER — LAMOTRIGINE 25 MG/1
TABLET ORAL
Qty: 180 TABLET | Refills: 1 | Status: SHIPPED | OUTPATIENT
Start: 2021-01-07

## 2021-01-07 NOTE — TELEPHONE ENCOUNTER
Pending Prescriptions:                       Disp   Refills    lamoTRIgine (LAMICTAL) 25 MG tablet [Pharm*180 ta*1        Sig: TAKE 2 TABLETS BY MOUTH EVERY DAY    Routing refill request to provider for review/approval because:  Patient fails protocol

## 2021-02-14 ENCOUNTER — HEALTH MAINTENANCE LETTER (OUTPATIENT)
Age: 41
End: 2021-02-14

## 2021-05-05 ENCOUNTER — MYC MEDICAL ADVICE (OUTPATIENT)
Dept: INTERNAL MEDICINE | Facility: CLINIC | Age: 41
End: 2021-05-05

## 2021-05-30 ENCOUNTER — RECORDS - HEALTHEAST (OUTPATIENT)
Dept: ADMINISTRATIVE | Facility: CLINIC | Age: 41
End: 2021-05-30

## 2021-05-31 ENCOUNTER — RECORDS - HEALTHEAST (OUTPATIENT)
Dept: ADMINISTRATIVE | Facility: CLINIC | Age: 41
End: 2021-05-31

## 2021-09-19 ENCOUNTER — HEALTH MAINTENANCE LETTER (OUTPATIENT)
Age: 41
End: 2021-09-19

## 2021-09-24 ENCOUNTER — OFFICE VISIT (OUTPATIENT)
Dept: DERMATOLOGY | Facility: CLINIC | Age: 41
End: 2021-09-24
Payer: COMMERCIAL

## 2021-09-24 VITALS — DIASTOLIC BLOOD PRESSURE: 84 MMHG | HEART RATE: 83 BPM | SYSTOLIC BLOOD PRESSURE: 135 MMHG | OXYGEN SATURATION: 98 %

## 2021-09-24 DIAGNOSIS — D22.9 MULTIPLE BENIGN NEVI: ICD-10-CM

## 2021-09-24 DIAGNOSIS — L82.1 SEBORRHEIC KERATOSIS: ICD-10-CM

## 2021-09-24 DIAGNOSIS — L81.4 LENTIGO: Primary | ICD-10-CM

## 2021-09-24 DIAGNOSIS — D18.01 CHERRY ANGIOMA: ICD-10-CM

## 2021-09-24 DIAGNOSIS — D48.5 NEOPLASM OF UNCERTAIN BEHAVIOR OF SKIN: ICD-10-CM

## 2021-09-24 PROCEDURE — 88305 TISSUE EXAM BY PATHOLOGIST: CPT | Performed by: PATHOLOGY

## 2021-09-24 PROCEDURE — 11102 TANGNTL BX SKIN SINGLE LES: CPT | Performed by: PHYSICIAN ASSISTANT

## 2021-09-24 PROCEDURE — 99203 OFFICE O/P NEW LOW 30 MIN: CPT | Mod: 25 | Performed by: PHYSICIAN ASSISTANT

## 2021-09-24 PROCEDURE — 11103 TANGNTL BX SKIN EA SEP/ADDL: CPT | Performed by: PHYSICIAN ASSISTANT

## 2021-09-24 NOTE — PATIENT INSTRUCTIONS
Wound Care Instructions     FOR SUPERFICIAL WOUNDS     [facility] [phone]    [facility] [phone]                       AFTER 24 HOURS YOU SHOULD REMOVE THE BANDAGE AND BEGIN DAILY DRESSING CHANGES AS FOLLOWS:     1) Remove Dressing.     2) Clean and dry the area with tap water using a Q-tip or sterile gauze pad.     3) Apply Vaseline, Aquaphor, Polysporin ointment or Bacitracin ointment over entire wound.  Do NOT use Neosporin ointment.     4) Cover the wound with a band-aid, or a sterile non-stick gauze pad and micropore paper tape      REPEAT THESE INSTRUCTIONS AT LEAST ONCE A DAY UNTIL THE WOUND HAS COMPLETELY HEALED.    It is an old wives tale that a wound heals better when it is exposed to air and allowed to dry out. The wound will heal faster with a better cosmetic result if it is kept moist with ointment and covered with a bandage.    **Do not let the wound dry out.**      Supplies Needed:      *Cotton tipped applicators (Q-tips)    *Polysporin Ointment or Bacitracin Ointment (NOT NEOSPORIN)    *Band-aids or non-stick gauze pads and micropore paper tape.      PATIENT INFORMATION:    During the healing process you will notice a number of changes. All wounds develop a small halo of redness surrounding the wound.  This means healing is occurring. Severe itching with extensive redness usually indicates sensitivity to the ointment or bandage tape used to dress the wound.  You should call our office if this develops.      Swelling  and/or discoloration around your surgical site is common, particularly when performed around the eye.    All wounds normally drain.  The larger the wound the more drainage there will be.  After 7-10 days, you will notice the wound beginning to shrink and new skin will begin to grow.  The wound is healed when you can see skin has formed over the entire area.  A healed wound has a healthy, shiny look to the surface and is red to dark pink in color  to normalize.  Wounds may take approximately 4-6 weeks to heal.  Larger wounds may take 6-8 weeks.  After the wound is healed you may discontinue dressing changes.    You may experience a sensation of tightness as your wound heals. This is normal and will gradually subside.    Your healed wound may be sensitive to temperature changes. This sensitivity improves with time, but if you re having a lot of discomfort, try to avoid temperature extremes.    Patients frequently experience itching after their wound appears to have healed because of the continue healing under the skin.  Plain Vaseline will help relieve the itching.        POSSIBLE COMPLICATIONS    BLEEDIN. Leave the bandage in place.  2. Use tightly rolled up gauze or a cloth to apply direct pressure over the bandage for 30  minutes.  3. Reapply pressure for an additional 30 minutes if necessary  4. Use additional gauze and tape to maintain pressure once the bleeding has stopped.

## 2021-09-24 NOTE — PROGRESS NOTES
Katrin Logan is an extremely pleasant 41 year old year old female patient here today for spot on chest and arm. She reports she noticed spots within past few weeks, looks inflamed. No tender, no bleeding.  Patient has no other skin complaints today.  Remainder of the HPI, Meds, PMH, Allergies, FH, and SH was reviewed in chart.    Pertinent Hx:  No personal history of skin cancer.   Past Medical History:   Diagnosis Date     Allergic rhinitis, cause unspecified      Cervical high risk HPV (human papillomavirus) test positive 3/21/2017       Past Surgical History:   Procedure Laterality Date     NO HISTORY OF SURGERY          Family History   Problem Relation Age of Onset     Gynecology Mother         hysterectomy     Lipids Father      Heart Disease Maternal Grandfather         pacemaker     Heart Disease Paternal Grandmother         CHF     Diabetes Paternal Grandfather      Heart Disease Paternal Grandfather         MI     Thyroid Disease Sister      Depression Sister         anxiety also dx       Social History     Socioeconomic History     Marital status:      Spouse name: Not on file     Number of children: Not on file     Years of education: Not on file     Highest education level: Not on file   Occupational History     Not on file   Tobacco Use     Smoking status: Never Smoker     Smokeless tobacco: Never Used   Substance and Sexual Activity     Alcohol use: Yes     Comment: 2-3 drinks per week     Drug use: No     Sexual activity: Never   Other Topics Concern     Not on file   Social History Narrative    Caffeine intake/servings daily - 2    Calcium intake/servings daily - 2    Exercise 3 times weekly - describe running or walking-yoga    Sunscreen used - Yes    Seatbelts used - Yes    Guns stored in the home - No    Self Breast Exam - No    Pap test up to date -  Yes    Eye exam up to date -  Yes    Dental exam up to date -  No    DEXA scan up to date -  Not Applicable    Flex Sig/Colonoscopy up to  date -  Not Applicable    Mammography up to date -  Not Applicable    Immunizations reviewed and up to date - No    Abuse: Current or Past (Physical, Sexual or Emotional) - No    Do you feel safe in your environment - Yes    Do you cope well with stress - Yes    Do you suffer from insomnia - No    Last updated by: Abi Christian  9/30/2005     Social Determinants of Health     Financial Resource Strain:      Difficulty of Paying Living Expenses:    Food Insecurity:      Worried About Running Out of Food in the Last Year:      Ran Out of Food in the Last Year:    Transportation Needs:      Lack of Transportation (Medical):      Lack of Transportation (Non-Medical):    Physical Activity:      Days of Exercise per Week:      Minutes of Exercise per Session:    Stress:      Feeling of Stress :    Social Connections:      Frequency of Communication with Friends and Family:      Frequency of Social Gatherings with Friends and Family:      Attends Hindu Services:      Active Member of Clubs or Organizations:      Attends Club or Organization Meetings:      Marital Status:    Intimate Partner Violence:      Fear of Current or Ex-Partner:      Emotionally Abused:      Physically Abused:      Sexually Abused:        Outpatient Encounter Medications as of 9/24/2021   Medication Sig Dispense Refill     ALPRAZolam (XANAX) 0.5 MG tablet Take 1 tablet (0.5 mg) by mouth 3 times daily as needed for anxiety 10 tablet 0     hydrOXYzine (ATARAX) 10 MG tablet TAKE ONE TABLET BY MOUTH EVERY 6 HOURS AS NEEDED FOR ANXIETY/ITCHING 120 tablet 1     lamoTRIgine (LAMICTAL) 25 MG tablet TAKE 2 TABLETS BY MOUTH EVERY  tablet 1     ondansetron (ZOFRAN ODT) 4 MG ODT tab Take 1 tablet (4 mg) by mouth every 8 hours as needed for nausea (Patient not taking: Reported on 9/24/2021) 12 tablet 0     No facility-administered encounter medications on file as of 9/24/2021.             O:   NAD, WDWN, Alert & Oriented, Mood & Affect wnl, Vitals  stable   Here today alone   /84   Pulse 83   SpO2 98%    General appearance normal   Vitals stable   Alert, oriented and in no acute distress   0.4 cm inflamed brown macule on left lateral chest  ` 0.5 cm inflamed brown macule on left upper arm   Stuck on papules and brown macules on trunk and ext   Red papules on trunk  Brown papules and macules with regular pigment network and borders on torso and extremities     The remainder of skin exam is normal       Eyes: Conjunctivae/lids:Normal     ENT: Lips: normal    MSK:Normal    Cardiovascular: peripheral edema none    Pulm: Breathing Normal    Neuro/Psych: Orientation:Alert and Orientedx3 ; Mood/Affect:normal   A/P:  1. R/O inflamed lentiginous nevus vs macular sk vs atypical nevi on left lateral chest and left upper arm  TANGENTIAL BIOPSY SENT OUT:  After consent, anesthesia with LEC and prep, tangential excision performed and specimen sent out for permanent section histology.  No complications and routine wound care. Patient told to call our office in 1-2 weeks for result.      2. Seborrheic keratosis, lentigo, angioma, benign nevi   BENIGN LESIONS DISCUSSED WITH PATIENT:  I discussed the specifics of tumor, prognosis, and genetics of benign lesions.  I explained that treatment of these lesions would be purely cosmetic and not medically neccessary.  I discussed with patient different removal options including excision, cautery and /or laser.      Nature and genetics of benign skin lesions dicussed with patient.  Signs and Symptoms of skin cancer discussed with patient.  ABCDEs of melanoma reviewed with patient.  Patient encouraged to perform monthly skin exams.  UV precautions reviewed with patient  Risks of non-melanoma skin cancer discussed with patient   Return to clinic pending biopsy results.

## 2021-09-24 NOTE — LETTER
9/24/2021         RE: Katrin Logan  [address] [address] [place] [address]        Dear Colleague,    Thank you for referring your patient, Katrin Logan, to the [facility]. Please see a copy of my visit note below.    Katrin Logan is an extremely pleasant 41 year old year old female patient here today for spot on chest and arm. She reports she noticed spots within past few weeks, looks inflamed. No tender, no bleeding.  Patient has no other skin complaints today.  Remainder of the HPI, Meds, PMH, Allergies, FH, and SH was reviewed in chart.    Pertinent Hx:  No personal history of skin cancer.   Past Medical History:   Diagnosis Date     Allergic rhinitis, cause unspecified      Cervical high risk HPV (human papillomavirus) test positive 3/21/2017       Past Surgical History:   Procedure Laterality Date     NO HISTORY OF SURGERY          Family History   Problem Relation Age of Onset     Gynecology Mother         hysterectomy     Lipids Father      Heart Disease Maternal Grandfather         pacemaker     Heart Disease Paternal Grandmother         CHF     Diabetes Paternal Grandfather      Heart Disease Paternal Grandfather         MI     Thyroid Disease Sister      Depression Sister         anxiety also dx       Social History     Socioeconomic History     Marital status:      Spouse name: Not on file     Number of children: Not on file     Years of education: Not on file     Highest education level: Not on file   Occupational History     Not on file   Tobacco Use     Smoking status: Never Smoker     Smokeless tobacco: Never Used   Substance and Sexual Activity     Alcohol use: Yes     Comment: 2-3 drinks per week     Drug use: No     Sexual activity: Never   Other Topics Concern     Not on file   Social History Narrative    Caffeine intake/servings daily - 2    Calcium intake/servings daily - 2    Exercise 3 times weekly - describe running or walking-yoga    Sunscreen used  - Yes    Seatbelts used - Yes    Guns stored in the home - No    Self Breast Exam - No    Pap test up to date -  Yes    Eye exam up to date -  Yes    Dental exam up to date -  No    DEXA scan up to date -  Not Applicable    Flex Sig/Colonoscopy up to date -  Not Applicable    Mammography up to date -  Not Applicable    Immunizations reviewed and up to date - No    Abuse: Current or Past (Physical, Sexual or Emotional) - No    Do you feel safe in your environment - Yes    Do you cope well with stress - Yes    Do you suffer from insomnia - No    Last updated by: Abi Christian  9/30/2005     Social Determinants of Health     Financial Resource Strain:      Difficulty of Paying Living Expenses:    Food Insecurity:      Worried About Running Out of Food in the Last Year:      Ran Out of Food in the Last Year:    Transportation Needs:      Lack of Transportation (Medical):      Lack of Transportation (Non-Medical):    Physical Activity:      Days of Exercise per Week:      Minutes of Exercise per Session:    Stress:      Feeling of Stress :    Social Connections:      Frequency of Communication with Friends and Family:      Frequency of Social Gatherings with Friends and Family:      Attends Shinto Services:      Active Member of Clubs or Organizations:      Attends Club or Organization Meetings:      Marital Status:    Intimate Partner Violence:      Fear of Current or Ex-Partner:      Emotionally Abused:      Physically Abused:      Sexually Abused:        Outpatient Encounter Medications as of 9/24/2021   Medication Sig Dispense Refill     ALPRAZolam (XANAX) 0.5 MG tablet Take 1 tablet (0.5 mg) by mouth 3 times daily as needed for anxiety 10 tablet 0     hydrOXYzine (ATARAX) 10 MG tablet TAKE ONE TABLET BY MOUTH EVERY 6 HOURS AS NEEDED FOR ANXIETY/ITCHING 120 tablet 1     lamoTRIgine (LAMICTAL) 25 MG tablet TAKE 2 TABLETS BY MOUTH EVERY  tablet 1     ondansetron (ZOFRAN ODT) 4 MG ODT tab Take 1 tablet (4 mg) by  mouth every 8 hours as needed for nausea (Patient not taking: Reported on 9/24/2021) 12 tablet 0     No facility-administered encounter medications on file as of 9/24/2021.             O:   NAD, WDWN, Alert & Oriented, Mood & Affect wnl, Vitals stable   Here today alone   /84   Pulse 83   SpO2 98%    General appearance normal   Vitals stable   Alert, oriented and in no acute distress   0.4 cm inflamed brown macule on left lateral chest  ` 0.5 cm inflamed brown macule on left upper arm   Stuck on papules and brown macules on trunk and ext   Red papules on trunk  Brown papules and macules with regular pigment network and borders on torso and extremities     The remainder of skin exam is normal       Eyes: Conjunctivae/lids:Normal     ENT: Lips: normal    MSK:Normal    Cardiovascular: peripheral edema none    Pulm: Breathing Normal    Neuro/Psych: Orientation:Alert and Orientedx3 ; Mood/Affect:normal   A/P:  1. R/O inflamed lentiginous nevus vs macular sk vs atypical nevi on left lateral chest and left upper arm  TANGENTIAL BIOPSY SENT OUT:  After consent, anesthesia with LEC and prep, tangential excision performed and specimen sent out for permanent section histology.  No complications and routine wound care. Patient told to call our office in 1-2 weeks for result.      2. Seborrheic keratosis, lentigo, angioma, benign nevi   BENIGN LESIONS DISCUSSED WITH PATIENT:  I discussed the specifics of tumor, prognosis, and genetics of benign lesions.  I explained that treatment of these lesions would be purely cosmetic and not medically neccessary.  I discussed with patient different removal options including excision, cautery and /or laser.      Nature and genetics of benign skin lesions dicussed with patient.  Signs and Symptoms of skin cancer discussed with patient.  ABCDEs of melanoma reviewed with patient.  Patient encouraged to perform monthly skin exams.  UV precautions reviewed with patient  Risks of  non-melanoma skin cancer discussed with patient   Return to clinic pending biopsy results.       Again, thank you for allowing me to participate in the care of your patient.        Sincerely,        Padma Paul PA-C

## 2021-09-28 LAB
PATH REPORT.COMMENTS IMP SPEC: NORMAL
PATH REPORT.FINAL DX SPEC: NORMAL
PATH REPORT.GROSS SPEC: NORMAL
PATH REPORT.MICROSCOPIC SPEC OTHER STN: NORMAL
PATH REPORT.RELEVANT HX SPEC: NORMAL

## 2022-03-06 ENCOUNTER — HEALTH MAINTENANCE LETTER (OUTPATIENT)
Age: 42
End: 2022-03-06

## 2022-05-09 ENCOUNTER — TELEPHONE (OUTPATIENT)
Dept: DERMATOLOGY | Facility: CLINIC | Age: 42
End: 2022-05-09
Payer: COMMERCIAL

## 2022-05-09 NOTE — TELEPHONE ENCOUNTER
"I spoke to patient and the area of concern on her left upper arm has recurred.    \"It is bigger and looks like a red blister-it is exactly the same as it was when she saw it last fall. I would love to get it biopsied since it is bigger than it used to be and it looks worse than it did when I came in...\"      Denies pain or itching.     I did ask if she could send a photo of area through My chart, but patient stated: \"It looks exactly like it did when she saw me last fall, but now it is bigger.. I don't know what she would see different..\"     (I did remind patient you see a lot of patients daily and this would help you recall, but she wanted me to check with you first)     Please advise. Isabella Sims RN      "

## 2022-05-09 NOTE — TELEPHONE ENCOUNTER
[facility] Call Center    Phone Message    May a detailed message be left on voicemail: yes     Reason for Call: Symptoms or Concerns     If patient has red-flag symptoms, warm transfer to triage line    Current symptom or concern: spot on upper arm came back that Padma had scraped and removed (was told to call if it comes back) - Pt said it is red bump (Pt is concerned about it) - bigger now than it was before    Symptoms have been present for:  1+ month(s)      Has patient previously been seen for this? Yes     By : Padma Meza PA-C     Date: 09/24/21    Are there any new or worsening symptoms? Yes: keeps getting worse - please call Pt to discuss      Action Taken: Message routed to:  Other: [place] [facility]    Travel Screening: Not Applicable

## 2022-05-10 NOTE — TELEPHONE ENCOUNTER
Message left to return call.     Will F 5-20-22 at 7:15 am work for patient?...      See Provider note below. Isabella Sims RN

## 2022-05-11 NOTE — TELEPHONE ENCOUNTER
Patient called back and is now scheduled for 5-20-22 7:15 am appointment as offered by Provider. Isabella Sims RN

## 2022-05-12 ENCOUNTER — TELEPHONE (OUTPATIENT)
Dept: DERMATOLOGY | Facility: CLINIC | Age: 42
End: 2022-05-12
Payer: COMMERCIAL

## 2022-05-12 NOTE — TELEPHONE ENCOUNTER
Called patient to offer appointment today at 10:30 a.m. instead of next Friday the 20 th. No answer. Left message to call back to see if this time will work for her. Time slot was held for her.   Mariah Jo LPN

## 2022-05-20 ENCOUNTER — OFFICE VISIT (OUTPATIENT)
Dept: DERMATOLOGY | Facility: CLINIC | Age: 42
End: 2022-05-20
Payer: COMMERCIAL

## 2022-05-20 VITALS — HEART RATE: 70 BPM | SYSTOLIC BLOOD PRESSURE: 122 MMHG | DIASTOLIC BLOOD PRESSURE: 80 MMHG | OXYGEN SATURATION: 97 %

## 2022-05-20 DIAGNOSIS — L91.0 KELOID: Primary | ICD-10-CM

## 2022-05-20 PROCEDURE — 11900 INJECT SKIN LESIONS </W 7: CPT | Performed by: PHYSICIAN ASSISTANT

## 2022-05-20 RX ORDER — TRIAMCINOLONE ACETONIDE 40 MG/ML
40 INJECTION, SUSPENSION INTRA-ARTICULAR; INTRAMUSCULAR ONCE
Status: COMPLETED | OUTPATIENT
Start: 2022-05-20 | End: 2022-05-20

## 2022-05-20 RX ADMIN — TRIAMCINOLONE ACETONIDE 40 MG: 40 INJECTION, SUSPENSION INTRA-ARTICULAR; INTRAMUSCULAR at 07:42

## 2022-05-20 NOTE — LETTER
5/20/2022         RE: Katrin Logan  [address] [address]  [place] [address]        Dear Colleague,    Thank you for referring your patient, Katrin Logan, to the [facility]. Please see a copy of my visit note below.    Katrin Logan is an extremely pleasant 42 year old year old female patient here today for recheck left upper arm. She had biopsy lov, which returned as a scar. She notes since has become more raised, sensitive.  Patient has no other skin complaints today.  Remainder of the HPI, Meds, PMH, Allergies, FH, and SH was reviewed in chart.    Past Medical History:   Diagnosis Date     Allergic rhinitis, cause unspecified      Cervical high risk HPV (human papillomavirus) test positive 3/21/2017       Past Surgical History:   Procedure Laterality Date     NO HISTORY OF SURGERY          Family History   Problem Relation Age of Onset     Gynecology Mother         hysterectomy     Lipids Father      Heart Disease Maternal Grandfather         pacemaker     Heart Disease Paternal Grandmother         CHF     Diabetes Paternal Grandfather      Heart Disease Paternal Grandfather         MI     Thyroid Disease Sister      Depression Sister         anxiety also dx       Social History     Socioeconomic History     Marital status:      Spouse name: Not on file     Number of children: Not on file     Years of education: Not on file     Highest education level: Not on file   Occupational History     Not on file   Tobacco Use     Smoking status: Never Smoker     Smokeless tobacco: Never Used   Substance and Sexual Activity     Alcohol use: Yes     Comment: 2-3 drinks per week     Drug use: No     Sexual activity: Never   Other Topics Concern     Not on file   Social History Narrative    Caffeine intake/servings daily - 2    Calcium intake/servings daily - 2    Exercise 3 times weekly - describe running or walking-yoga    Sunscreen used - Yes    Seatbelts used - Yes    Guns stored in  the home - No    Self Breast Exam - No    Pap test up to date -  Yes    Eye exam up to date -  Yes    Dental exam up to date -  No    DEXA scan up to date -  Not Applicable    Flex Sig/Colonoscopy up to date -  Not Applicable    Mammography up to date -  Not Applicable    Immunizations reviewed and up to date - No    Abuse: Current or Past (Physical, Sexual or Emotional) - No    Do you feel safe in your environment - Yes    Do you cope well with stress - Yes    Do you suffer from insomnia - No    Last updated by: Abi Christian  2005     Social Determinants of Health     Financial Resource Strain: Not on file   Food Insecurity: Not on file   Transportation Needs: Not on file   Physical Activity: Not on file   Stress: Not on file   Social Connections: Not on file   Intimate Partner Violence: Not on file   Housing Stability: Not on file       Outpatient Encounter Medications as of 2022   Medication Sig Dispense Refill     ALPRAZolam (XANAX) 0.5 MG tablet Take 1 tablet (0.5 mg) by mouth 3 times daily as needed for anxiety 10 tablet 0     hydrOXYzine (ATARAX) 10 MG tablet TAKE ONE TABLET BY MOUTH EVERY 6 HOURS AS NEEDED FOR ANXIETY/ITCHING 120 tablet 1     lamoTRIgine (LAMICTAL) 25 MG tablet TAKE 2 TABLETS BY MOUTH EVERY  tablet 1     ondansetron (ZOFRAN ODT) 4 MG ODT tab Take 1 tablet (4 mg) by mouth every 8 hours as needed for nausea (Patient not taking: No sig reported) 12 tablet 0     [] triamcinolone (KENALOG-40) injection 40 mg        No facility-administered encounter medications on file as of 2022.             O:   NAD, WDWN, Alert & Oriented, Mood & Affect wnl, Vitals stable   Here today alone   /80 (BP Location: Left arm, Patient Position: Sitting, Cuff Size: Adult Large)   Pulse 70   SpO2 97%    General appearance normal   Vitals stable   Alert, oriented and in no acute distress     Pink keloid on left arm     Eyes: Conjunctivae/lids:Normal     ENT: Lips:  normal    MSK:Normal    Pulm: Breathing Normal    Neuro/Psych: Orientation:Alert and Orientedx3 ; Mood/Affect:normal   A/P:  1. Small keloid on left upper arm  IL TAC: PGACAC discussed.  Risks including but not limited to injection site reaction, bruising, no resolution.  All questions answered and entertained to patient s satisfaction.  Informed consent obtained.  IL TAC in concentration of 40mg/ml was injected ID to keloid.  Total injected was  0.1 ml.  Patient tolerated without complications and given wound care instructions, including not to move product around.  Return in 4 weeks for follow-up and possible additional IL TAC.          Again, thank you for allowing me to participate in the care of your patient.        Sincerely,        Padma Paul PA-C

## 2022-05-20 NOTE — NURSING NOTE
Chief Complaint   Patient presents with     Derm Problem     Spot on left upper arm, spot returned after biopsy        Vitals:    05/20/22 0733   BP: 122/80   BP Location: Left arm   Patient Position: Sitting   Cuff Size: Adult Large   Pulse: 70   SpO2: 97%     Wt Readings from Last 1 Encounters:   06/10/20 88.5 kg (195 lb)       Mariah Jo LPN .................5/20/2022

## 2022-05-23 NOTE — PROGRESS NOTES
Katrin Logan is an extremely pleasant 42 year old year old female patient here today for recheck left upper arm. She had biopsy lov, which returned as a scar. She notes since has become more raised, sensitive.  Patient has no other skin complaints today.  Remainder of the HPI, Meds, PMH, Allergies, FH, and SH was reviewed in chart.    Past Medical History:   Diagnosis Date     Allergic rhinitis, cause unspecified      Cervical high risk HPV (human papillomavirus) test positive 3/21/2017       Past Surgical History:   Procedure Laterality Date     NO HISTORY OF SURGERY          Family History   Problem Relation Age of Onset     Gynecology Mother         hysterectomy     Lipids Father      Heart Disease Maternal Grandfather         pacemaker     Heart Disease Paternal Grandmother         CHF     Diabetes Paternal Grandfather      Heart Disease Paternal Grandfather         MI     Thyroid Disease Sister      Depression Sister         anxiety also dx       Social History     Socioeconomic History     Marital status:      Spouse name: Not on file     Number of children: Not on file     Years of education: Not on file     Highest education level: Not on file   Occupational History     Not on file   Tobacco Use     Smoking status: Never Smoker     Smokeless tobacco: Never Used   Substance and Sexual Activity     Alcohol use: Yes     Comment: 2-3 drinks per week     Drug use: No     Sexual activity: Never   Other Topics Concern     Not on file   Social History Narrative    Caffeine intake/servings daily - 2    Calcium intake/servings daily - 2    Exercise 3 times weekly - describe running or walking-yoga    Sunscreen used - Yes    Seatbelts used - Yes    Guns stored in the home - No    Self Breast Exam - No    Pap test up to date -  Yes    Eye exam up to date -  Yes    Dental exam up to date -  No    DEXA scan up to date -  Not Applicable    Flex Sig/Colonoscopy up to date -  Not Applicable    Mammography up to date  -  Not Applicable    Immunizations reviewed and up to date - No    Abuse: Current or Past (Physical, Sexual or Emotional) - No    Do you feel safe in your environment - Yes    Do you cope well with stress - Yes    Do you suffer from insomnia - No    Last updated by: Abi Christian  2005     Social Determinants of Health     Financial Resource Strain: Not on file   Food Insecurity: Not on file   Transportation Needs: Not on file   Physical Activity: Not on file   Stress: Not on file   Social Connections: Not on file   Intimate Partner Violence: Not on file   Housing Stability: Not on file       Outpatient Encounter Medications as of 2022   Medication Sig Dispense Refill     ALPRAZolam (XANAX) 0.5 MG tablet Take 1 tablet (0.5 mg) by mouth 3 times daily as needed for anxiety 10 tablet 0     hydrOXYzine (ATARAX) 10 MG tablet TAKE ONE TABLET BY MOUTH EVERY 6 HOURS AS NEEDED FOR ANXIETY/ITCHING 120 tablet 1     lamoTRIgine (LAMICTAL) 25 MG tablet TAKE 2 TABLETS BY MOUTH EVERY  tablet 1     ondansetron (ZOFRAN ODT) 4 MG ODT tab Take 1 tablet (4 mg) by mouth every 8 hours as needed for nausea (Patient not taking: No sig reported) 12 tablet 0     [] triamcinolone (KENALOG-40) injection 40 mg        No facility-administered encounter medications on file as of 2022.             O:   NAD, WDWN, Alert & Oriented, Mood & Affect wnl, Vitals stable   Here today alone   /80 (BP Location: Left arm, Patient Position: Sitting, Cuff Size: Adult Large)   Pulse 70   SpO2 97%    General appearance normal   Vitals stable   Alert, oriented and in no acute distress     Pink keloid on left arm     Eyes: Conjunctivae/lids:Normal     ENT: Lips: normal    MSK:Normal    Pulm: Breathing Normal    Neuro/Psych: Orientation:Alert and Orientedx3 ; Mood/Affect:normal   A/P:  1. Small keloid on left upper arm  IL TAC: PGACAC discussed.  Risks including but not limited to injection site reaction, bruising, no resolution.   All questions answered and entertained to patient s satisfaction.  Informed consent obtained.  IL TAC in concentration of 40mg/ml was injected ID to keloid.  Total injected was  0.1 ml.  Patient tolerated without complications and given wound care instructions, including not to move product around.  Return in 4 weeks for follow-up and possible additional IL TAC.

## 2022-11-21 ENCOUNTER — HEALTH MAINTENANCE LETTER (OUTPATIENT)
Age: 42
End: 2022-11-21

## 2023-04-16 ENCOUNTER — HEALTH MAINTENANCE LETTER (OUTPATIENT)
Age: 43
End: 2023-04-16

## 2023-04-24 NOTE — TELEPHONE ENCOUNTER
Reason for call;  Pt called and looking for  Rx of Lithium and has been out for last 4 days , is trying to  Switch  to [facility] for PCP and has 1st  physical scheduled - 4/5/19 appt at [facility]  Dr LEANN Dyson .    Recommendation / teaching ; [facility]  Found out answer to question is urgent Care is unable to help refill her lithium Rx - but Pt didn't stay on the phone to get this answer . Called back Pt and advised contact her ordering provider and Pt agrees.      Caller Verbalizes understanding and denies further questions and will call back if further symptoms to triage or questions  .  Beth Ray RN  - [place] Nurse Advisor                  
Observation and assessment

## 2024-02-04 ENCOUNTER — HEALTH MAINTENANCE LETTER (OUTPATIENT)
Age: 44
End: 2024-02-04

## 2024-06-23 ENCOUNTER — HEALTH MAINTENANCE LETTER (OUTPATIENT)
Age: 44
End: 2024-06-23